# Patient Record
Sex: FEMALE | Race: WHITE | Employment: OTHER | URBAN - METROPOLITAN AREA
[De-identification: names, ages, dates, MRNs, and addresses within clinical notes are randomized per-mention and may not be internally consistent; named-entity substitution may affect disease eponyms.]

---

## 2018-01-05 ENCOUNTER — HOSPITAL ENCOUNTER (OUTPATIENT)
Dept: ULTRASOUND IMAGING | Age: 68
Discharge: HOME | End: 2018-01-05
Attending: FAMILY MEDICINE
Payer: MEDICARE

## 2018-01-05 ENCOUNTER — HOSPITAL ENCOUNTER (OUTPATIENT)
Dept: CT IMAGING | Age: 68
Discharge: HOME | End: 2018-01-05
Attending: FAMILY MEDICINE
Payer: MEDICARE

## 2018-01-05 DIAGNOSIS — R41.3 MEMORY CHANGE: ICD-10-CM

## 2018-01-05 DIAGNOSIS — I65.29 CAROTID ATHEROSCLEROSIS, UNSPECIFIED LATERALITY: ICD-10-CM

## 2018-01-05 PROCEDURE — 93880 EXTRACRANIAL BILAT STUDY: CPT

## 2018-01-05 PROCEDURE — 70450 CT HEAD/BRAIN W/O DYE: CPT

## 2018-01-30 ENCOUNTER — HOSPITAL ENCOUNTER (OUTPATIENT)
Dept: LAB | Age: 68
Discharge: HOME | End: 2018-01-30
Payer: MEDICARE

## 2018-01-30 DIAGNOSIS — E78.00 HYPERCHOLESTEREMIA: Chronic | ICD-10-CM

## 2018-01-30 LAB
CHOLEST SERPL-MCNC: 277 MG/DL
HDLC SERPL-MCNC: 88 MG/DL (ref 40–60)
HDLC SERPL: 3.1 {RATIO}
LDLC SERPL CALC-MCNC: 166.8 MG/DL
LIPID PROFILE,FLP: ABNORMAL
TRIGL SERPL-MCNC: 111 MG/DL (ref 35–150)
VLDLC SERPL CALC-MCNC: 22.2 MG/DL (ref 6–23)

## 2018-01-30 PROCEDURE — 80061 LIPID PANEL: CPT | Performed by: INTERNAL MEDICINE

## 2018-01-30 PROCEDURE — 36415 COLL VENOUS BLD VENIPUNCTURE: CPT | Performed by: INTERNAL MEDICINE

## 2018-01-30 NOTE — PROGRESS NOTES
TOTAL CHOLESTEROL AND LDL MUCH HIGHER OFF LIPITOR. ? DOES SHE WANT TO RESUME THIS?  IF SO, RESUME PRIOR DOSE AND RECHECK LIPID AND LIVER IN 3 MOS

## 2018-06-19 PROBLEM — R29.6 FALLING: Status: ACTIVE | Noted: 2018-06-19

## 2018-06-19 PROBLEM — G47.52 REM SLEEP BEHAVIOR DISORDER: Status: ACTIVE | Noted: 2018-06-19

## 2018-06-19 PROBLEM — G31.83 LEWY BODY DEMENTIA WITHOUT BEHAVIORAL DISTURBANCE (HCC): Status: ACTIVE | Noted: 2018-06-19

## 2018-06-19 PROBLEM — F02.80 LEWY BODY DEMENTIA WITHOUT BEHAVIORAL DISTURBANCE (HCC): Status: ACTIVE | Noted: 2018-06-19

## 2018-06-19 PROBLEM — R44.3 HALLUCINATION: Status: ACTIVE | Noted: 2018-06-19

## 2018-07-02 ENCOUNTER — HOSPITAL ENCOUNTER (OUTPATIENT)
Dept: PHYSICAL THERAPY | Age: 68
Discharge: HOME | End: 2018-07-02
Attending: PSYCHIATRY & NEUROLOGY
Payer: MEDICARE

## 2018-07-02 DIAGNOSIS — R29.6 FALLING: ICD-10-CM

## 2018-07-02 DIAGNOSIS — F02.80 LEWY BODY DEMENTIA WITHOUT BEHAVIORAL DISTURBANCE (HCC): ICD-10-CM

## 2018-07-02 DIAGNOSIS — G31.83 LEWY BODY DEMENTIA WITHOUT BEHAVIORAL DISTURBANCE (HCC): ICD-10-CM

## 2018-07-02 PROCEDURE — G8979 MOBILITY GOAL STATUS: HCPCS

## 2018-07-02 PROCEDURE — G8978 MOBILITY CURRENT STATUS: HCPCS

## 2018-07-02 PROCEDURE — 97112 NEUROMUSCULAR REEDUCATION: CPT

## 2018-07-02 PROCEDURE — G8980 MOBILITY D/C STATUS: HCPCS

## 2018-07-02 PROCEDURE — 97161 PT EVAL LOW COMPLEX 20 MIN: CPT

## 2018-07-02 NOTE — THERAPY EVALUATION
Bettye Banda  : 1950  [place]  Secondary: [address]  [address] [facility], [address] [place] [address] [address], 4999 [facility]  [phone]   [phone]        OUTPATIENT PHYSICAL THERAPY:Initial Assessment and Discharge 2018   ICD-10: Treatment Diagnosis: Difficulty in walking, not elsewhere classified (R26.2)  Precautions/Allergies:   Nucynta [tapentadol]; Percocet [oxycodone-acetaminophen]; and Tramadol   Fall Risk Score: 5 (? 5 = High Risk)  MD Orders: Evaluate and treat  MEDICAL/REFERRING DIAGNOSIS:  Lewy body dementia without behavioral disturbance [G31.83, F02.80]  Falling [R29.6]   DATE OF ONSET: Chronic   REFERRING PHYSICIAN: Rosalind Vázquez MD  RETURN PHYSICIAN APPOINTMENT: unknown      INITIAL ASSESSMENT:  Ms. Dayanara Vogt presents within normal limits with lower extremity strength testing, and above fall risk with scores received on Mccracken Balance Scale and Dynamic Gait Index. Patient reports her symptoms have improved tremendously since starting Aricept. Patient seems a bit impulsive and tends to turn quickly with movements. Patient was advised to slow down with walking, with body turns, and with steps. I recommend patient see our speech therapy for a evaluation. Patient agreeable to this plan. Patient given home exercise program consisting of balance exercises. Problems and goals not stated. Patient discharged from physical therapy. Patient agreeable with this plan. Thank you for this referral.      PROBLEM LIST (Impacting functional limitations):  1. No problems stated INTERVENTIONS PLANNED:  1. No interventions stated   TREATMENT PLAN:  Effective Dates: 2018. Frequency/Duration: One time appointment for initial evaluation and home exercise program.    GOALS: (Goals have been discussed and agreed upon with patient.)  Short-Term Functional Goals: Time Frame:   1.  No short term goals stated   Discharge Goals: Time Frame:   1. No discharge goals stated  Regarding Briseyda Ogden's therapy, I certify that the treatment plan above will be carried out by a therapist or under their direction. Thank you for this referral,  Khadijah Rivas PT     Referring Physician Signature: Roxana Navarrete MD              Date                    The information in this section was collected on 7/2/2018 (except where otherwise noted). HISTORY:   History of Present Injury/Illness (Reason for Referral):  Patient reports a significant history of falling. Patient reports beginning Aricept several weeks ago. Patient reports this has helped her symptoms tremendously. Patient reports increased energy. Patient is walking daily for exercise. Patient rates pain level as 0/10, and dizziness as 0/10. Patient reports still having some difficulty with numbers. Patient reports writing a check for 10,000 dollars when it was suppose to be 10 dollars. 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 Past Medical History/Comorbidities:   Ms. Rick Moody  has a past medical history of CAD (coronary artery disease); Falling (6/19/2018); Hallucination (6/19/2018); Hyperlipidemia (9/29/2015); Lewy body dementia without behavioral disturbance (6/19/2018); Mitral valve regurgitation (7/21/2015); Premature ventricular contractions (9/29/2015); REM sleep behavior disorder (6/19/2018); S/P MVR (mitral valve replacement) (2/95/4878); and Systolic CHF, acute on chronic ([facility] [address] (07/22/2015). Ms. Rick Moody  has a past surgical history that includes hx hysterectomy (2003); pr breast surgery procedure unlisted; hx tonsillectomy (age 17); hx heart catheterization (7/14/2015); and hx breast biopsy. Social History/Living Environment:     Lives with spouse   Prior Level of Function/Work/Activity:  Independent. Currently not working. Dominant Side:         RIGHT  Personal Factors:          Sex:  female        Age:  74 y.o.   Current Medications:       Current Outpatient Prescriptions:     donepezil (ARICEPT) 5 mg tablet, 1 qhs, call in one month for 10 mg taking QAM., Disp: 30 Tab, Rfl: 0    escitalopram oxalate (LEXAPRO) 20 mg tablet, Take 1 Tab by mouth daily. , Disp: 90 Tab, Rfl: 3    estradiol (CLIMARA) 0.0375 mg/24 hr, 1 Patch by TransDERmal route Every Saturday. , Disp: 12 Patch, Rfl: 3    atorvastatin (LIPITOR) 40 mg tablet, Take 1 Tab by mouth daily. , Disp: 90 Tab, Rfl: 3   Date Last Reviewed:  7/2/2018   Number of Personal Factors/Comorbidities that affect the Plan of Care: 0: LOW COMPLEXITY   EXAMINATION:   Observation/Orthostatic Postural Assessment: Forward head/rounded shoulders posture  Functional Mobility:         Gait/Ambulation:  Patient ambulates with normal gait pattern. Strength:          Hip flexion 4+/5, hip abduction 4+/5, hip adduction 5/5, quadriceps 5/5, hamstrings 5/5, ankle dorsiflexion 5/5, and ankle plantarflexion 5/5. Sensation:         Within normal limits. Postural Control & Balance:  · Mccracken Balance Scale:  56/56.   (A score less than 45/56 indicates high risk of falls)     · Dynamic Gait Index:  23/24.   (A score less than or equal to19/24 is abnormal and predictive of falls)     · [employer] Equ[employer]UR Aerospacet Sensory Organization Test:  Not tested. Body Structures Involved:  1. None Body Functions Affected:  1. Neuromusculoskeletal Activities and Participation Affected:  1. None   Number of elements (examined above) that affect the Plan of Care: 1-2: LOW COMPLEXITY   CLINICAL PRESENTATION:   Presentation: Stable and uncomplicated: LOW COMPLEXITY   CLINICAL DECISION MAKING:   Outcome Measure: Tool Used: Mccracken Balance Scale  Score:  Initial: 56/56 Most Recent: X/56 (Date: -- )   Interpretation of Score: Each section is scored on a 0-4 scale, 0 representing the patients inability to perform the task and 4 representing independence.   The scores of each section are added together for a total score of 56.  The higher the patients score, the more independent the patient is. Any score below 45 indicates increased risk for falls. Score 56 55-45 44-34 33-23 22-12 11-1 0   Modifier CH CI CJ CK CL CM CN     ? Mobility - Walking and Moving Around:     - CURRENT STATUS: CH - 0% impaired, limited or restricted    - GOAL STATUS: CH - 0% impaired, limited or restricted    - D/C STATUS:  CH - 0% impaired, limited or restricted    ·    Use of outcome tool(s) and clinical judgement create a POC that gives a: Clear prediction of patient's progress: LOW COMPLEXITY            TREATMENT:   (In addition to Assessment/Re-Assessment sessions the following treatments were rendered)  Pre-treatment Symptoms/Complaints:  None  Pain: Initial:   Pain Intensity 1: 0  Post Session:  0     Initial Evaluation: 30 minutes  NEUROMUSCULAR RE-EDUCATION: (15 minutes):  Exercise/activities per grid below to improve balance and coordination. Required minimal verbal cues to promote dynamic balance in standing. Date:  7/2/2018 Date:   Date:     Activity/Exercise Parameters Parameters Parameters   Marching in place x10B     Walking with horizontal head turns 4 laps     Walking with vertical head turns 4 laps     Feet together Eyes closed     Tandem stance Eyes open     Single leg stance Eyes open           [employer] [facility]  Treatment/Session Assessment:    · Response to Treatment:  Tolerated without issues. · Recommendations/Intent for next treatment session: One time appointment for initial evaluation and home exercise program.  Patient discharged from physical therapy.   Total Treatment Duration:  PT Patient Time In/Time Out  Time In: 1300  Time Out: 2000 Robert Friedman

## 2018-07-02 NOTE — PROGRESS NOTES
Ambulatory/Rehab Services H2 Model Falls Risk Assessment    Risk Factor Pts. ·   Confusion/Disorientation/Impulsivity  []    4 ·   Symptomatic Depression  []   2 ·   Altered Elimination  []   1 ·   Dizziness/Vertigo  []   1 ·   Gender (Male)  []   1 ·   Any administered antiepileptics (anticonvulsants):  []   2 ·   Any administered benzodiazepines:  []   1 ·   Visual Impairment (specify):  []   1 ·   Portable Oxygen Use  []   1 ·   Orthostatic ? BP  []   1 ·   History of Recent Falls (within 3 mos.)  [x]   5     Ability to Rise from Chair (choose one) Pts. ·   Ability to rise in a single movement  [x]   0 ·   Pushes up, successful in one attempt  []   1 ·   Multiple attempts, but successful  []   3 ·   Unable to rise without assistance  []   4   Total: (5 or greater = High Risk) 5     Falls Prevention Plan:   []                Physical Limitations to Exercise (specify):   []                Mobility Assistance Device (type):   [x]                Exercise/Equipment Adaptation (specify): Patient will be supervised in the clinic at all times due to higher fall risk. ©2010 [facility] . [facility] States Patent #[id number].  Federal Law prohibits the replication, distribution or use without written permission from [facility]

## 2018-07-06 ENCOUNTER — HOSPITAL ENCOUNTER (OUTPATIENT)
Dept: LAB | Age: 68
Discharge: HOME | End: 2018-07-06
Attending: PSYCHIATRY & NEUROLOGY
Payer: MEDICARE

## 2018-07-06 DIAGNOSIS — R44.3 HALLUCINATION: ICD-10-CM

## 2018-07-06 LAB — TSH SERPL DL<=0.005 MIU/L-ACNC: 1.92 UIU/ML (ref 0.36–3.74)

## 2018-07-06 PROCEDURE — 84443 ASSAY THYROID STIM HORMONE: CPT | Performed by: PSYCHIATRY & NEUROLOGY

## 2018-07-06 PROCEDURE — 36415 COLL VENOUS BLD VENIPUNCTURE: CPT | Performed by: PSYCHIATRY & NEUROLOGY

## 2018-07-09 ENCOUNTER — HOSPITAL ENCOUNTER (OUTPATIENT)
Dept: MRI IMAGING | Age: 68
Discharge: HOME | End: 2018-07-09
Attending: PSYCHIATRY & NEUROLOGY
Payer: MEDICARE

## 2018-07-09 DIAGNOSIS — R44.3 HALLUCINATION: ICD-10-CM

## 2018-07-09 PROCEDURE — 70551 MRI BRAIN STEM W/O DYE: CPT

## 2018-07-09 NOTE — PROGRESS NOTES
Please notify patient that her brain MRI looked normal, no obvious brain volume loss, no stroke, no brain tumor.

## 2018-07-18 ENCOUNTER — HOSPITAL ENCOUNTER (OUTPATIENT)
Dept: PHYSICAL THERAPY | Age: 68
Discharge: HOME | End: 2018-07-18
Attending: PSYCHIATRY & NEUROLOGY
Payer: MEDICARE

## 2018-07-18 DIAGNOSIS — F02.80 LEWY BODY DEMENTIA WITHOUT BEHAVIORAL DISTURBANCE (HCC): ICD-10-CM

## 2018-07-18 DIAGNOSIS — G31.83 LEWY BODY DEMENTIA WITHOUT BEHAVIORAL DISTURBANCE (HCC): ICD-10-CM

## 2018-07-18 PROCEDURE — 92523 SPEECH SOUND LANG COMPREHEN: CPT | Performed by: SPEECH-LANGUAGE PATHOLOGIST

## 2018-07-18 PROCEDURE — G9168 MEMORY CURRENT STATUS: HCPCS | Performed by: SPEECH-LANGUAGE PATHOLOGIST

## 2018-07-18 PROCEDURE — G9169 MEMORY GOAL STATUS: HCPCS | Performed by: SPEECH-LANGUAGE PATHOLOGIST

## 2018-07-19 NOTE — THERAPY EVALUATION
Radhika Hough  : 1950  [place]  Secondary: [address] at [facility]  27[address] [facility], [address] [place] [address] [address], 2045 [facility]  [phone]   [phone]         OUTPATIENT SPEECH LANGUAGE PATHOLOGY: Initial Assessment    ICD-10: Treatment Diagnosis: Frontal lobe and executive function deficit R41.844   REFERRING PHYSICIAN: Ana M Evans MD MD Orders: evaluate and treat  Return Physician Appointment: 2018  PAST MEDICAL HISTORY:   Ms. Ally Jiménez is a 74 y.o. female who  has a past medical history of CAD (coronary artery disease); Falling (2018); Hallucination (2018); Hyperlipidemia (2015); Lewy body dementia without behavioral disturbance (2018); Mitral valve regurgitation (2015); Premature ventricular contractions (2015); REM sleep behavior disorder (2018); S/P MVR (mitral valve replacement) (); and Systolic CHF, acute on chronic ([facility] [address] (2015). She also  has a past surgical history that includes hx hysterectomy (); pr breast surgery procedure unlisted; hx tonsillectomy (age 17); hx heart catheterization (2015); and hx breast biopsy. MEDICAL/REFERRING DIAGNOSIS: Lewy body dementia without behavioral disturbance [G31.83, F02.80]  DATE OF ONSET: 2018   PRIOR LEVEL OF FUNCTION: Independent with ADL's  PRECAUTIONS/ALLERGIES: NKDA  ASSESSMENT:  Patient is a 74year old female who was referred for speech evaluation due to recent diagnosis of Lewy body dementia. Patient stated that as she looks back over the last few months, there were definite signs that she was having trouble cognitively. She stated that she had difficulty with money management, memory, visual perception, sequencing and learning new skills.  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 Bang Jimenez who started patient on Aricept. Patient states much improvement with vision and less hallucinations. Currently patient's spouse manages finances however she continues to drive. Based on the objective data described below, the patient presents with mild moderate cognitive deficits. She was assessed with the [employer]. She scored Mod I to Independent in the areas of: recent memory and remote memory. She scored profound in the area of immediate memory at 37%tile and moderately impaired in the area of temporal orientation (recent memory) at 50%tile. All other subtest's had to be deferred as patient would get off task requiring some redirection in addition to patient arriving late to her appointment. Therefore other sub tests will be given at next scheduled visit. Patient would benefit from skilled intervention to provide patient with compensatory strategies to help maximize her functional independence with ADL's. ST recommends therapy at 1x a week. Patient will benefit from skilled intervention to address the above impairments. ?????? ? ? This section established at most recent assessment??????????  PROBLEM LIST (Impairments causing functional limitations):  1. Cognitive deficits  2. Executive functioning deficits  GOALS: (Goals have been discussed and agreed upon with patient.)  SHORT-TERM FUNCTIONAL GOALS: Time Frame: 3-6 months  1. Patient will complete immediate and delayed recall tasks with the use of strategies at Mod A with 50% accuracy. 2. Patient will complete following multi step directions at [address] with 80% accuracy. 3. Patient will complete organization tasks at Mod A with 60% accuracy. 4. Patient will complete time and money tasks at Mod A with 60% accuracy. 5. Patient will complete attention tasks at Mod A with 70% accuracy. DISCHARGE GOALS: Time Frame: 12 weeks  1.  Patient will increase cognitive and linguistic skills for ADL's across all setting at [address] [place] [address] with 80% accuracy. REHABILITATION POTENTIAL FOR STATED GOALS: Donavan Galliano OF CARE:  INTERVENTIONS PLANNED: (Benefits and precautions of therapy have been discussed with the patient.)  1. Speech therapy  TREATMENT PLAN EFFECTIVE DATES: 7/18/2018 TO 10/18/2018 (90 days). FREQUENCY/DURATION: Continue to follow patient 1 time a week for 90 days to address above goals. Regarding An Ogden's therapy, I certify that the treatment plan above will be carried out by a therapist or under their direction. Thank you for this referral,  ENDY Bloom Ed CCC-SLP                   Referring Physician Signature: Roger Gibbs MD     Date      SUBJECTIVE:  Alert but anxious  Present Symptoms: Memory loss      Current Medications:   Current Outpatient Prescriptions on File Prior to Encounter   Medication Sig Dispense Refill    donepezil (ARICEPT) 5 mg tablet 1 tab by mouth in am 90 Tab 1    escitalopram oxalate (LEXAPRO) 20 mg tablet Take 1 Tab by mouth daily. 90 Tab 3    estradiol (CLIMARA) 0.0375 mg/24 hr 1 Patch by TransDERmal route Every Saturday. 12 Patch 3    atorvastatin (LIPITOR) 40 mg tablet Take 1 Tab by mouth daily. 90 Tab 3     No current facility-administered medications on file prior to encounter. Date Last Reviewed: 7/18/18  Social History/Home Situation:        Work/Activity History: retired    OBJECTIVE:  Objective Measure: Tool Used: National Outcomes Measurement System: Functional Communication Measures: MEMORY  Score:  Initial: 4 Most Recent: X (Date: -- )   Interpretation of Tool: This measure describes the change in functional communication status subsequent to speech-language pathology treatment of patients who have memory deficits. o Level 1:  The individual is unable to recall any information, regardless of cueing.   o Level 2: The individual consistently requires maximal verbal cues or uses external aids to recall personal information (e.g., family members, biographical information, physical location, etc.) in structured environments. o Level 3: The individual usually requires maximum cues to recall or use external aids for simple routine and personal information (e.g., schedule, names of familiar staff, location of therapy areas, etc.) in structured environments. o Level 4: The individual occasionally requires minimal cues to recall or use external memory aids for simple routine and personal information in structured environments. The individual requires consistent maximal cues to recall or use memory aids for complex and novel information (e.g., carry out multiple steps activities, accommodate schedule changes, anticipate meal times, etc.), plan and follow through on simple future events (e.g., use calendar to keep appointments, use log books to complete a single assignment/task, etc.) in structured environments. o Level 5 The individual consistently requires minimal cues to recall or use external memory aids for complex and novel information. The individual consistently requires minimal cues to plan and follow through on complex future events (e.g., menu planning and meal preparation, planning a party, etc.).  o Level 6: The individual is able to recall or use external aids/memory strategies for complex information and planning complex future events most of the time. When there is a breakdown in the use of recall/memory strategies/external memory aids, the individual occasionally requires minimal cues. These breakdowns may occasionally interfere with the individuals functioning in vocational, avocational, and social activities. o Level 7: The individual is successful and independent in recalling or using external aids/memory strategies for complex information and planning future events in all vocational, avocational, and social activities. Score Level 7 Level 6 Level 5 Level 4 Level 3 Level 2 Level 1   Modifier CH CI CJ CK CL CM CN   ?  Memory:  - CURRENT STATUS: CK - 40%-59% impaired, limited or restricted    - GOAL STATUS:  CI - 1%-19% impaired, limited or restricted    - D/C STATUS:  ---------------To be determined---------------           Oral Motor Structure/Speech:  Oral-Motor Structure/Motor Speech  Face: No impairment  Labial: No impairment  Dentition: Natural  Lingual: No impairment  Velum: No impairment  Mandible: No impairment  Apraxic Characteristics: None  Dysarthric Characteristics: None  Intelligibility: No impairment  Intonation: WNl  Rate: WNL  Prosody: WNL  Overall Impairment Severity: None    SPEECH-LANGUAGE COGNITIVE EVALUATION  Tests Given:Ross Information Processing Assessment    Mental Status:  Neurologic State: Alert  Orientation Level: Disoriented to time;Oriented to place;Oriented to person  Cognition: Follows commands;Memory loss  Perception: Appears intact  Perseveration: No perseveration noted  Safety/Judgement: Awareness of environment    Motor Speech:  Oral-Motor Structure/Motor Speech  Face: No impairment  Labial: No impairment  Dentition: Natural  Lingual: No impairment  Velum: No impairment  Mandible: No impairment  Apraxic Characteristics: None  Dysarthric Characteristics: None  Intelligibility: No impairment  Intonation: WNl  Rate: WNL  Prosody: WNL  Overall Impairment Severity: None    Auditory Comprehension:   Auditory Comprehension  Auditory Impairment: No     Reading Comprehension:   Reading Comprehension  Visual Impairment: No impairment  Scanning/Tracking : No impairment  Sentence: No Impairment  Paragraph : No impairment  Oral Reading: No impairment  Overall Impairment Severity: None    Verbal Expression:   Verbal Expression  Primary Mode of Expression: Verbal  Initiation: No impairment  Automatic Speech Task: No impairment  Repetition: No impairment  Naming: No impairment  Sentence Completion: No impairment  Sentence Formulation: No impairment  Conversation: No impairment  Overall Impairment: None    Written Expression:   Written Expression  Pre-Morbid Dominant Hand: Unknown/unable to assess    Neuro-Linguistics:   Memory: Impaired  Mathematical: Impaired  Organization: Impaired  Assessment/Reassessment only, no treatment provided today  __________________________________________________________________________________________________  History of Present Injury/Illness (Reason for Referral): Lewy body dementia  Treatment Assessment:  Evaluation completed. Progression/Medical Necessity:   · Patient is expected to demonstrate progress in cognitive ability to decrease assistance required communication and increase independence with activities of daily living. Compliance with Program/Exercises: Will assess as treatment progresses. Reason for Continuation of Services/Other Comments:  · Patient continues to require skilled intervention due to medical complications. Recommendations/Intent for next treatment session: \"Treatment next visit will focus on goals\". Total Treatment Duration:  Time In: 1430  Time Out: [address], Corinne Fails. Miriam Settler

## 2018-07-30 PROBLEM — R94.01 ABNORMAL EEG: Status: ACTIVE | Noted: 2018-07-30

## 2018-08-08 ENCOUNTER — HOSPITAL ENCOUNTER (OUTPATIENT)
Dept: PHYSICAL THERAPY | Age: 68
Discharge: HOME | End: 2018-08-08
Attending: PSYCHIATRY & NEUROLOGY
Payer: MEDICARE

## 2018-08-15 ENCOUNTER — HOSPITAL ENCOUNTER (OUTPATIENT)
Dept: PHYSICAL THERAPY | Age: 68
Discharge: HOME | End: 2018-08-15
Attending: PSYCHIATRY & NEUROLOGY
Payer: MEDICARE

## 2018-08-15 PROCEDURE — 92507 TX SP LANG VOICE COMM INDIV: CPT | Performed by: SPEECH-LANGUAGE PATHOLOGIST

## 2018-08-15 NOTE — THERAPY EVALUATION
Romayne Lust  : 1950  [place]  Secondary: [facility] [address] [facility], [address] [address], [address] [facility]  [phone]   [phone]         OUTPATIENT SPEECH LANGUAGE PATHOLOGY: Daily Note 1    ICD-10: Treatment Diagnosis: Frontal lobe and executive function deficit R41.844   REFERRING PHYSICIAN: Jody Whitman MD MD Orders: evaluate and treat  Return Physician Appointment: 2018  PAST MEDICAL HISTORY:   Ms. Yara Cuello is a 79 y.o. female who  has a past medical history of Abnormal EEG (2018); CAD (coronary artery disease); Falling (2018); Hallucination (2018); Hyperlipidemia (2015); Lewy body dementia without behavioral disturbance (2018); Mitral valve regurgitation (2015); Premature ventricular contractions (2015); REM sleep behavior disorder (2018); S/P MVR (mitral valve replacement) (); and Systolic CHF, acute on chronic ([facility] [address] (2015). She also  has a past surgical history that includes hx hysterectomy (); pr breast surgery procedure unlisted; hx tonsillectomy (age 17); hx heart catheterization (2015); and hx breast biopsy. MEDICAL/REFERRING DIAGNOSIS: Dementia in other diseases classified elsewhere without behavioral disturbance [F02.80]  Dementia with Lewy bodies [G31.83]  DATE OF ONSET: 2018   PRIOR LEVEL OF FUNCTION: Independent with ADL's  PRECAUTIONS/ALLERGIES: NKDA  ASSESSMENT:  Patient reports overall feeling well. States that she has a good and bad days. Reports having another EEG soon to check for seizures. Otherwise she has no complaints. Patient will benefit from skilled intervention to address the above impairments. ?????? ? ? This section established at most recent assessment??????????  PROBLEM LIST (Impairments causing functional limitations):  1. Cognitive deficits  2.  Executive functioning deficits  GOALS: (Goals have been discussed and agreed upon with patient.)  SHORT-TERM FUNCTIONAL GOALS: Time Frame: 3-6 months  1. Patient will complete immediate and delayed recall tasks with the use of strategies at Mod A with 50% accuracy. 2. Patient will complete following multi step directions at [address] with 80% accuracy. 3. Patient will complete organization tasks at Mod A with 60% accuracy. 4. Patient will complete time and money tasks at Mod A with 60% accuracy. 5. Patient will complete attention tasks at Mod A with 70% accuracy. DISCHARGE GOALS: Time Frame: 12 weeks  1. Patient will increase cognitive and linguistic skills for ADL's across all setting at [address] with 80% accuracy. REHABILITATION POTENTIAL FOR STATED GOALS: [place] OF CARE:  INTERVENTIONS PLANNED: (Benefits and precautions of therapy have been discussed with the patient.)  1. Speech therapy  TREATMENT PLAN EFFECTIVE DATES: 7/18/2018 TO 10/18/2018 (90 days). FREQUENCY/DURATION: Continue to follow patient 1 time a week for 90 days to address above goals. Regarding Briseyda Ogden's therapy, I certify that the treatment plan above will be carried out by a therapist or under their direction. Thank you for this referral,  ENDY Santiago Ed CCC-SLP                   Referring Physician Signature: Lisa Smith MD     Date      SUBJECTIVE:  Alert but anxious  Present Symptoms: Memory loss   Pain Intensity 1: 0  Current Medications:   Current Outpatient Prescriptions on File Prior to Encounter   Medication Sig Dispense Refill    cholecalciferol (VITAMIN D3) 1,000 unit tablet Take  by mouth daily.  donepezil (ARICEPT) 5 mg tablet 1 tab by mouth in am 90 Tab 1    escitalopram oxalate (LEXAPRO) 20 mg tablet Take 1 Tab by mouth daily. 90 Tab 3    estradiol (CLIMARA) 0.0375 mg/24 hr 1 Patch by TransDERmal route Every Saturday. 12 Patch 3    atorvastatin (LIPITOR) 40 mg tablet Take 1 Tab by mouth daily.  90 Tab 3     No current facility-administered medications on file prior to encounter. Date Last Reviewed: 8/15/18  Social History/Home Situation:        Work/Activity History: retired    OBJECTIVE:  Objective Measure: Tool Used: National Outcomes Measurement System: Functional Communication Measures: MEMORY  Score:  Initial: 4 Most Recent: X (Date: -- )   Interpretation of Tool: This measure describes the change in functional communication status subsequent to speech-language pathology treatment of patients who have memory deficits. o Level 1:  The individual is unable to recall any information, regardless of cueing. o Level 2: The individual consistently requires maximal verbal cues or uses external aids to recall personal information (e.g., family members, biographical information, physical location, etc.) in structured environments. o Level 3: The individual usually requires maximum cues to recall or use external aids for simple routine and personal information (e.g., schedule, names of familiar staff, location of therapy areas, etc.) in structured environments. o Level 4: The individual occasionally requires minimal cues to recall or use external memory aids for simple routine and personal information in structured environments. The individual requires consistent maximal cues to recall or use memory aids for complex and novel information (e.g., carry out multiple steps activities, accommodate schedule changes, anticipate meal times, etc.), plan and follow through on simple future events (e.g., use calendar to keep appointments, use log books to complete a single assignment/task, etc.) in structured environments. o Level 5 The individual consistently requires minimal cues to recall or use external memory aids for complex and novel information.  The individual consistently requires minimal cues to plan and follow through on complex future events (e.g., menu planning and meal preparation, planning a party, etc.).  o Level 6: The individual is able to recall or use external aids/memory strategies for complex information and planning complex future events most of the time. When there is a breakdown in the use of recall/memory strategies/external memory aids, the individual occasionally requires minimal cues. These breakdowns may occasionally interfere with the individuals functioning in vocational, avocational, and social activities. o Level 7: The individual is successful and independent in recalling or using external aids/memory strategies for complex information and planning future events in all vocational, avocational, and social activities. Score Level 7 Level 6 Level 5 Level 4 Level 3 Level 2 Level 1   Modifier CH CI CJ CK CL CM CN   ? Memory:     - CURRENT STATUS: CK - 40%-59% impaired, limited or restricted    - GOAL STATUS:  CI - 1%-19% impaired, limited or restricted    - D/C STATUS:  ---------------To be determined---------------           Oral Motor Structure/Speech:       SPEECH-LANGUAGE COGNITIVE EVALUATION  Tests Given:Ross Information Processing Assessment    Mental Status:                      Motor Speech: Auditory Comprehension:        Reading Comprehension:        Verbal Expression:        Written Expression:        Neuro-Linguistics:    Patient completed following directions 2 step with 4 components  1st trial: Mod A with 55% accuracy   2nd trial: Mod I 90% accuracy     Completed time task (drawing appropriate hands on clocks) Min A with 83% accuracy    Daily orientation questions at:   Day of the week: +  Month +  Date +  Year +           Cognitive Skills Activities: Activities/Procedures listed utilized to improve and/or restore cognitive function as related to attention to tasks, memory, sequencing, thought organization and compensatory strategies for recall.  Required minimal visual, verbal, manual and   cueing to improve improve recall of information and improve ability to perform graded processes in steps.      __________________________________________________________________________________________________  History of Present Injury/Illness (Reason for Referral): Lewy body dementia  Treatment Assessment:  Evaluation completed. Progression/Medical Necessity:   · Patient is expected to demonstrate progress in cognitive ability to decrease assistance required communication and increase independence with activities of daily living. Compliance with Program/Exercises: Will assess as treatment progresses. Reason for Continuation of Services/Other Comments:  · Patient continues to require skilled intervention due to medical complications. Recommendations/Intent for next treatment session: \"Treatment next visit will focus on goals\". Total Treatment Duration:  Time In: 4240  Time Out: [facility], Aime Lee. Nancy Hebert

## 2018-08-22 ENCOUNTER — HOSPITAL ENCOUNTER (OUTPATIENT)
Dept: PHYSICAL THERAPY | Age: 68
Discharge: HOME | End: 2018-08-22
Attending: PSYCHIATRY & NEUROLOGY
Payer: MEDICARE

## 2018-08-22 PROCEDURE — 92507 TX SP LANG VOICE COMM INDIV: CPT | Performed by: SPEECH-LANGUAGE PATHOLOGIST

## 2018-08-22 NOTE — PROGRESS NOTES
Gino Gentile  : 1950  [place]  Secondary: [facility] [address] [facility], [address] [address], [address] [facility]  [phone]   [phone]         OUTPATIENT SPEECH LANGUAGE PATHOLOGY: Daily Note 2    ICD-10: Treatment Diagnosis: Frontal lobe and executive function deficit R41.844   REFERRING PHYSICIAN: Erasmo Hollis MD MD Orders: evaluate and treat  Return Physician Appointment: 2018  PAST MEDICAL HISTORY:   Ms. Leonel Catherine is a 79 y.o. female who  has a past medical history of Abnormal EEG (2018); CAD (coronary artery disease); Falling (2018); Hallucination (2018); Hyperlipidemia (2015); Lewy body dementia without behavioral disturbance (2018); Mitral valve regurgitation (2015); Premature ventricular contractions (2015); REM sleep behavior disorder (2018); S/P MVR (mitral valve replacement) (); and Systolic CHF, acute on chronic ([facility] [address] (2015). She also  has a past surgical history that includes hx hysterectomy (); pr breast surgery procedure unlisted; hx tonsillectomy (age 17); hx heart catheterization (2015); and hx breast biopsy. MEDICAL/REFERRING DIAGNOSIS: Dementia in other diseases classified elsewhere without behavioral disturbance [F02.80]  Dementia with Lewy bodies [G31.83]  DATE OF ONSET: 2018   PRIOR LEVEL OF FUNCTION: Independent with ADL's  PRECAUTIONS/ALLERGIES: NKDA  ASSESSMENT:  Patient reports overall feeling well. States that she has a good and bad days. Otherwise she has no complaints. Patient will benefit from skilled intervention to address the above impairments. ?????? ? ? This section established at most recent assessment??????????  PROBLEM LIST (Impairments causing functional limitations):  1. Cognitive deficits  2.  Executive functioning deficits  GOALS: (Goals have been discussed and agreed upon with patient.)  SHORT-TERM FUNCTIONAL GOALS: Time Frame: 3-6 months  1. Patient will complete immediate and delayed recall tasks with the use of strategies at Mod A with 50% accuracy. 2. Patient will complete following multi step directions at [address] with 80% accuracy. 3. Patient will complete organization tasks at Mod A with 60% accuracy. 4. Patient will complete time and money tasks at Mod A with 60% accuracy. 5. Patient will complete attention tasks at Mod A with 70% accuracy. DISCHARGE GOALS: Time Frame: 12 weeks  1. Patient will increase cognitive and linguistic skills for ADL's across all setting at [address] with 80% accuracy. REHABILITATION POTENTIAL FOR STATED GOALS: [place] OF CARE:  INTERVENTIONS PLANNED: (Benefits and precautions of therapy have been discussed with the patient.)  1. Speech therapy  TREATMENT PLAN EFFECTIVE DATES: 7/18/2018 TO 10/18/2018 (90 days). FREQUENCY/DURATION: Continue to follow patient 1 time a week for 90 days to address above goals. Regarding Briseyda Ogden's therapy, I certify that the treatment plan above will be carried out by a therapist or under their direction. Thank you for this referral,  ENDY Mccauley Ed CCC-SLP                   Referring Physician Signature: Kenny Reeder MD     Date      SUBJECTIVE:  Alert but anxious  Present Symptoms: Memory loss   Pain Intensity 1: 0  Current Medications:   Current Outpatient Prescriptions on File Prior to Encounter   Medication Sig Dispense Refill    cholecalciferol (VITAMIN D3) 1,000 unit tablet Take  by mouth daily.  donepezil (ARICEPT) 5 mg tablet 1 tab by mouth in am 90 Tab 1    escitalopram oxalate (LEXAPRO) 20 mg tablet Take 1 Tab by mouth daily. 90 Tab 3    estradiol (CLIMARA) 0.0375 mg/24 hr 1 Patch by TransDERmal route Every Saturday. 12 Patch 3    atorvastatin (LIPITOR) 40 mg tablet Take 1 Tab by mouth daily. 90 Tab 3     No current facility-administered medications on file prior to encounter. Date Last Reviewed: 8/22/18  Social History/Home Situation:        Work/Activity History: retired    OBJECTIVE:  Objective Measure: Tool Used: National Outcomes Measurement System: Functional Communication Measures: MEMORY  Score:  Initial: 4 Most Recent: X (Date: -- )   Interpretation of Tool: This measure describes the change in functional communication status subsequent to speech-language pathology treatment of patients who have memory deficits. o Level 1:  The individual is unable to recall any information, regardless of cueing. o Level 2: The individual consistently requires maximal verbal cues or uses external aids to recall personal information (e.g., family members, biographical information, physical location, etc.) in structured environments. o Level 3: The individual usually requires maximum cues to recall or use external aids for simple routine and personal information (e.g., schedule, names of familiar staff, location of therapy areas, etc.) in structured environments. o Level 4: The individual occasionally requires minimal cues to recall or use external memory aids for simple routine and personal information in structured environments. The individual requires consistent maximal cues to recall or use memory aids for complex and novel information (e.g., carry out multiple steps activities, accommodate schedule changes, anticipate meal times, etc.), plan and follow through on simple future events (e.g., use calendar to keep appointments, use log books to complete a single assignment/task, etc.) in structured environments. o Level 5 The individual consistently requires minimal cues to recall or use external memory aids for complex and novel information.  The individual consistently requires minimal cues to plan and follow through on complex future events (e.g., menu planning and meal preparation, planning a party, etc.).  o Level 6: The individual is able to recall or use external aids/memory strategies for complex information and planning complex future events most of the time. When there is a breakdown in the use of recall/memory strategies/external memory aids, the individual occasionally requires minimal cues. These breakdowns may occasionally interfere with the individuals functioning in vocational, avocational, and social activities. o Level 7: The individual is successful and independent in recalling or using external aids/memory strategies for complex information and planning future events in all vocational, avocational, and social activities. Score Level 7 Level 6 Level 5 Level 4 Level 3 Level 2 Level 1   Modifier CH CI CJ CK CL CM CN   ? Memory:     - CURRENT STATUS: CK - 40%-59% impaired, limited or restricted    - GOAL STATUS:  CI - 1%-19% impaired, limited or restricted    - D/C STATUS:  ---------------To be determined---------------           Oral Motor Structure/Speech:       SPEECH-LANGUAGE COGNITIVE EVALUATION  Tests Given:Ross Information Processing Assessment    Mental Status:                      Motor Speech: Auditory Comprehension:        Reading Comprehension:        Verbal Expression:        Written Expression:        Neuro-Linguistics: Reviewed homework with minimal corrections     Problem solving and reasoning task at [address] [place] [address] with 80% accuracy             Cognitive Skills Activities: Activities/Procedures listed utilized to improve and/or restore cognitive function as related to attention to tasks, memory, sequencing, thought organization and compensatory strategies for recall.  Required minimal visual, verbal, manual and   cueing to improve improve recall of information and improve ability to perform graded processes in steps.      __________________________________________________________________________________________________  History of Present Injury/Illness (Reason for Referral): Lewy body dementia  Treatment Assessment:  Evaluation completed. Progression/Medical Necessity:   · Patient is expected to demonstrate progress in cognitive ability to decrease assistance required communication and increase independence with activities of daily living. Compliance with Program/Exercises: Will assess as treatment progresses. Reason for Continuation of Services/Other Comments:  · Patient continues to require skilled intervention due to medical complications. Recommendations/Intent for next treatment session: \"Treatment next visit will focus on goals\". Total Treatment Duration:  Time In: 1030  Time Out: [address] [facility]. Angeline Godfrey

## 2018-08-29 ENCOUNTER — HOSPITAL ENCOUNTER (OUTPATIENT)
Dept: PHYSICAL THERAPY | Age: 68
Discharge: HOME | End: 2018-08-29
Attending: PSYCHIATRY & NEUROLOGY
Payer: MEDICARE

## 2018-08-29 PROCEDURE — 92507 TX SP LANG VOICE COMM INDIV: CPT | Performed by: SPEECH-LANGUAGE PATHOLOGIST

## 2018-08-29 NOTE — PROGRESS NOTES
Trinidad Mejia  : 1950  [place]  Secondary: [place] [id number] [facility], [address] [place] [address],[address], [place] [address].  [phone]   [phone]         OUTPATIENT SPEECH LANGUAGE PATHOLOGY: Daily Note 3    ICD-10: Treatment Diagnosis: Frontal lobe and executive function deficit R41.844   REFERRING PHYSICIAN: Brian Dc MD MD Orders: evaluate and treat  Return Physician Appointment: 2018  PAST MEDICAL HISTORY:   Ms. Alecia Tamez is a 79 y.o. female who  has a past medical history of Abnormal EEG (2018); CAD (coronary artery disease); Falling (2018); Hallucination (2018); Hyperlipidemia (2015); Lewy body dementia without behavioral disturbance (2018); Mitral valve regurgitation (2015); Premature ventricular contractions (2015); REM sleep behavior disorder (2018); S/P MVR (mitral valve replacement) (1181); and Systolic CHF, acute on chronic ([facility] [address] (2015). She also  has a past surgical history that includes hx hysterectomy (); pr breast surgery procedure unlisted; hx tonsillectomy (age 17); hx heart catheterization (2015); and hx breast biopsy. MEDICAL/REFERRING DIAGNOSIS: Dementia in other diseases classified elsewhere without behavioral disturbance [F02.80]  Dementia with Lewy bodies [G31.83]  DATE OF ONSET: 2018   PRIOR LEVEL OF FUNCTION: Independent with ADL's  PRECAUTIONS/ALLERGIES: NKDA  ASSESSMENT:  Patient reports overall feeling well. States that she has a good and bad days. Otherwise she has no complaints. Patient will benefit from skilled intervention to address the above impairments. ?????? ? ? This section established at most recent assessment??????????  PROBLEM LIST (Impairments causing functional limitations):  1. Cognitive deficits  2.  Executive functioning deficits  GOALS: (Goals have been discussed and agreed upon with patient.)  SHORT-TERM FUNCTIONAL GOALS: Time Frame: 3-6 months  1. Patient will complete immediate and delayed recall tasks with the use of strategies at Mod A with 50% accuracy. 2. Patient will complete following multi step directions at [address] with 80% accuracy. 3. Patient will complete organization tasks at Mod A with 60% accuracy. 4. Patient will complete time and money tasks at Mod A with 60% accuracy. 5. Patient will complete attention tasks at Mod A with 70% accuracy. DISCHARGE GOALS: Time Frame: 12 weeks  1. Patient will increase cognitive and linguistic skills for ADL's across all setting at [address] with 80% accuracy. REHABILITATION POTENTIAL FOR STATED GOALS: Ana Hash OF CARE:  INTERVENTIONS PLANNED: (Benefits and precautions of therapy have been discussed with the patient.)  1. Speech therapy  TREATMENT PLAN EFFECTIVE DATES: 7/18/2018 TO 10/18/2018 (90 days). FREQUENCY/DURATION: Continue to follow patient 1 time a week for 90 days to address above goals. Regarding Briseyda Ogden's therapy, I certify that the treatment plan above will be carried out by a therapist or under their direction. Thank you for this referral,  ENDY Andrade Ed CCC-SLP                   Referring Physician Signature: Mando Arce MD     Date      SUBJECTIVE:  Alert but anxious  Present Symptoms: Memory loss   Pain Intensity 1: 0  Current Medications:   Current Outpatient Prescriptions on File Prior to Encounter   Medication Sig Dispense Refill    cholecalciferol (VITAMIN D3) 1,000 unit tablet Take  by mouth daily.  donepezil (ARICEPT) 5 mg tablet 1 tab by mouth in am 90 Tab 1    escitalopram oxalate (LEXAPRO) 20 mg tablet Take 1 Tab by mouth daily. 90 Tab 3    estradiol (CLIMARA) 0.0375 mg/24 hr 1 Patch by TransDERmal route Every Saturday. 12 Patch 3    atorvastatin (LIPITOR) 40 mg tablet Take 1 Tab by mouth daily. 90 Tab 3     No current facility-administered medications on file prior to encounter. Date Last Reviewed: 8/29/18  Social History/Home Situation:        Work/Activity History: retired    OBJECTIVE:  Objective Measure: Tool Used: National Outcomes Measurement System: Functional Communication Measures: MEMORY  Score:  Initial: 4 Most Recent: X (Date: -- )   Interpretation of Tool: This measure describes the change in functional communication status subsequent to speech-language pathology treatment of patients who have memory deficits. o Level 1:  The individual is unable to recall any information, regardless of cueing. o Level 2: The individual consistently requires maximal verbal cues or uses external aids to recall personal information (e.g., family members, biographical information, physical location, etc.) in structured environments. o Level 3: The individual usually requires maximum cues to recall or use external aids for simple routine and personal information (e.g., schedule, names of familiar staff, location of therapy areas, etc.) in structured environments. o Level 4: The individual occasionally requires minimal cues to recall or use external memory aids for simple routine and personal information in structured environments. The individual requires consistent maximal cues to recall or use memory aids for complex and novel information (e.g., carry out multiple steps activities, accommodate schedule changes, anticipate meal times, etc.), plan and follow through on simple future events (e.g., use calendar to keep appointments, use log books to complete a single assignment/task, etc.) in structured environments. o Level 5 The individual consistently requires minimal cues to recall or use external memory aids for complex and novel information.  The individual consistently requires minimal cues to plan and follow through on complex future events (e.g., menu planning and meal preparation, planning a party, etc.).  o Level 6: The individual is able to recall or use external aids/memory strategies for complex information and planning complex future events most of the time. When there is a breakdown in the use of recall/memory strategies/external memory aids, the individual occasionally requires minimal cues. These breakdowns may occasionally interfere with the individuals functioning in vocational, avocational, and social activities. o Level 7: The individual is successful and independent in recalling or using external aids/memory strategies for complex information and planning future events in all vocational, avocational, and social activities. Score Level 7 Level 6 Level 5 Level 4 Level 3 Level 2 Level 1   Modifier CH CI CJ CK CL CM CN   ? Memory:     - CURRENT STATUS: CK - 40%-59% impaired, limited or restricted    - GOAL STATUS:  CI - 1%-19% impaired, limited or restricted    - D/C STATUS:  ---------------To be determined---------------           Oral Motor Structure/Speech:       SPEECH-LANGUAGE COGNITIVE EVALUATION  Tests Given:Ross Information Processing Assessment    Mental Status:                      Motor Speech: Auditory Comprehension:        Reading Comprehension:        Verbal Expression:        Written Expression:        Neuro-Linguistics: Reviewed homework with minimal corrections     Patient completed time management tasks at: Max A with 40% accuracy secondary to decreased mathematical calculations. Increased time for task completion. Cognitive Skills Activities: Activities/Procedures listed utilized to improve and/or restore cognitive function as related to attention to tasks, memory, sequencing, thought organization and compensatory strategies for recall.  Required minimal visual, verbal, manual and   cueing to improve improve recall of information and improve ability to perform graded processes in steps.      __________________________________________________________________________________________________  History of Present Injury/Illness (Reason for Referral): Lewy body dementia  Treatment Assessment:  Evaluation completed. Progression/Medical Necessity:   · Patient is expected to demonstrate progress in cognitive ability to decrease assistance required communication and increase independence with activities of daily living. Compliance with Program/Exercises: Will assess as treatment progresses. Reason for Continuation of Services/Other Comments:  · Patient continues to require skilled intervention due to medical complications. Recommendations/Intent for next treatment session: \"Treatment next visit will focus on goals\". Total Treatment Duration:  Time In: 1030  Time Out: [address] [place], [place]. Donavan Parker

## 2018-09-19 ENCOUNTER — HOSPITAL ENCOUNTER (OUTPATIENT)
Dept: PHYSICAL THERAPY | Age: 68
Discharge: HOME | End: 2018-09-19
Attending: PSYCHIATRY & NEUROLOGY
Payer: MEDICARE

## 2018-09-19 PROCEDURE — 92507 TX SP LANG VOICE COMM INDIV: CPT | Performed by: SPEECH-LANGUAGE PATHOLOGIST

## 2018-09-19 PROCEDURE — 97127 HC ST THER IVNTJ W/FOCUS COG FUNCJ: CPT | Performed by: SPEECH-LANGUAGE PATHOLOGIST

## 2018-09-19 NOTE — PROGRESS NOTES
Enzo Ar  : 1950  [place]  Secondary: [facility] [address] [facility], [address] [place] [address],[address], [place] [address].  [phone]   [phone]         OUTPATIENT SPEECH LANGUAGE PATHOLOGY: Daily Note and Discharge 5    ICD-10: Treatment Diagnosis: Frontal lobe and executive function deficit R41.844   REFERRING PHYSICIAN: Alberta Lefort, MD MD Orders: evaluate and treat  Return Physician Appointment: 2018  PAST MEDICAL HISTORY:   Ms. Jeanne Arriaga is a 79 y.o. female who  has a past medical history of Abnormal EEG (2018); CAD (coronary artery disease); Falling (2018); Hallucination (2018); Hyperlipidemia (2015); Lewy body dementia without behavioral disturbance (2018); Mitral valve regurgitation (2015); Premature ventricular contractions (2015); REM sleep behavior disorder (2018); S/P MVR (mitral valve replacement) (); and Systolic CHF, acute on chronic ([facility] [address] (2015). She also  has a past surgical history that includes hx hysterectomy (); pr breast surgery procedure unlisted; hx tonsillectomy (age 17); hx heart catheterization (2015); and hx breast biopsy. MEDICAL/REFERRING DIAGNOSIS: Dementia in other diseases classified elsewhere without behavioral disturbance [F02.80]  Dementia with Lewy bodies [G31.83]  DATE OF ONSET: 2018   PRIOR LEVEL OF FUNCTION: Independent with ADL's  PRECAUTIONS/ALLERGIES: NKDA  ASSESSMENT:  Patient reports overall feeling well. States that she has a good and bad days. Otherwise she has no complaints. She has done well with most cognitive tasks with the exception of time. ST feels that patient's cognition is stable and she's responded well to Aricept therefore ST will d/c at this time. Patient will benefit from skilled intervention to address the above impairments. ?????? ? ? This section established at most recent assessment??????????  PROBLEM LIST (Impairments causing functional limitations):  1. Cognitive deficits  2. Executive functioning deficits  GOALS: (Goals have been discussed and agreed upon with patient.)  SHORT-TERM FUNCTIONAL GOALS: Time Frame: 3-6 months  1. Patient will complete immediate and delayed recall tasks with the use of strategies at Mod A with 50% accuracy. Met  2. Patient will complete following multi step directions at [address] with 80% accuracy. Partially Met  3. Patient will complete organization tasks at Mod A with 60% accuracy. Met  4. Patient will complete time and money tasks at Mod A with 60% accuracy. Not met  5. Patient will complete attention tasks at Mod A with 70% accuracy. Met  DISCHARGE GOALS: Time Frame: 12 weeks  1. Patient will increase cognitive and linguistic skills for ADL's across all setting at [address] with 80% accuracy. Met  REHABILITATION POTENTIAL FOR STATED GOALS: Good   SUBJECTIVE:  Alert but anxious  Present Symptoms: Memory loss   Pain Intensity 1: 0  Current Medications:   Current Outpatient Prescriptions on File Prior to Encounter   Medication Sig Dispense Refill    cholecalciferol (VITAMIN D3) 1,000 unit tablet Take  by mouth daily.  donepezil (ARICEPT) 5 mg tablet 1 tab by mouth in am 90 Tab 1    escitalopram oxalate (LEXAPRO) 20 mg tablet Take 1 Tab by mouth daily. 90 Tab 3    estradiol (CLIMARA) 0.0375 mg/24 hr 1 Patch by TransDERmal route Every Saturday. 12 Patch 3    atorvastatin (LIPITOR) 40 mg tablet Take 1 Tab by mouth daily. 90 Tab 3     No current facility-administered medications on file prior to encounter. Date Last Reviewed: 9/19/18  Social History/Home Situation:        Work/Activity History: retired    OBJECTIVE:  Objective Measure:   Tool Used: National Outcomes Measurement System: Functional Communication Measures: MEMORY  Score:  Initial: 4 Most Recent: X (Date: -- )   Interpretation of Tool: This measure describes the change in functional communication status subsequent to speech-language pathology treatment of patients who have memory deficits. o Level 1:  The individual is unable to recall any information, regardless of cueing. o Level 2: The individual consistently requires maximal verbal cues or uses external aids to recall personal information (e.g., family members, biographical information, physical location, etc.) in structured environments. o Level 3: The individual usually requires maximum cues to recall or use external aids for simple routine and personal information (e.g., schedule, names of familiar staff, location of therapy areas, etc.) in structured environments. o Level 4: The individual occasionally requires minimal cues to recall or use external memory aids for simple routine and personal information in structured environments. The individual requires consistent maximal cues to recall or use memory aids for complex and novel information (e.g., carry out multiple steps activities, accommodate schedule changes, anticipate meal times, etc.), plan and follow through on simple future events (e.g., use calendar to keep appointments, use log books to complete a single assignment/task, etc.) in structured environments. o Level 5 The individual consistently requires minimal cues to recall or use external memory aids for complex and novel information. The individual consistently requires minimal cues to plan and follow through on complex future events (e.g., menu planning and meal preparation, planning a party, etc.).  o Level 6: The individual is able to recall or use external aids/memory strategies for complex information and planning complex future events most of the time. When there is a breakdown in the use of recall/memory strategies/external memory aids, the individual occasionally requires minimal cues.   These breakdowns may occasionally interfere with the individuals functioning in vocational, avocational, and social activities. o Level 7: The individual is successful and independent in recalling or using external aids/memory strategies for complex information and planning future events in all vocational, avocational, and social activities. Score Level 7 Level 6 Level 5 Level 4 Level 3 Level 2 Level 1   Modifier CH CI CJ CK CL CM CN   ? Memory:     - CURRENT STATUS: CK - 40%-59% impaired, limited or restricted    - GOAL STATUS:  CI - 1%-19% impaired, limited or restricted    - D/C STATUS:  CI - 1%-19% impaired, limited or restricted           Oral Motor Structure/Speech:       SPEECH-LANGUAGE COGNITIVE EVALUATION  Tests Given:Ross Information Processing Assessment    Mental Status:    Neuro-Linguistics:     Completed memory recall tasks at [address] with 80% accuracy    Completed word finding tasks at Mod I 90% accuracy    Completed time management tasks with Mod to Max A due to decreased time reasoning    Completed delayed memory recall tasks at [address] with 85% accuracy              Cognitive Skills Activities: Activities/Procedures listed utilized to improve and/or restore cognitive function as related to attention to tasks, memory, sequencing, thought organization and compensatory strategies for recall. Required minimal visual, verbal, manual and   cueing to improve improve recall of information and improve ability to perform graded processes in steps.      __________________________________________________________________________________________________  Discharge this date. Total Treatment Duration:  Time In: 1115  Time Out: 1 [place], [place]. Jesús Oropeza

## 2018-09-26 ENCOUNTER — HOSPITAL ENCOUNTER (OUTPATIENT)
Dept: PHYSICAL THERAPY | Age: 68
End: 2018-09-26
Attending: PSYCHIATRY & NEUROLOGY
Payer: MEDICARE

## 2018-12-03 ENCOUNTER — APPOINTMENT (OUTPATIENT)
Dept: MRI IMAGING | Age: 68
End: 2018-12-03
Attending: HOSPITALIST
Payer: MEDICARE

## 2018-12-03 ENCOUNTER — APPOINTMENT (OUTPATIENT)
Dept: CT IMAGING | Age: 68
End: 2018-12-03
Attending: EMERGENCY MEDICINE
Payer: MEDICARE

## 2018-12-03 ENCOUNTER — HOSPITAL ENCOUNTER (OUTPATIENT)
Age: 68
Setting detail: OBSERVATION
Discharge: HOME | End: 2018-12-04
Attending: EMERGENCY MEDICINE | Admitting: HOSPITALIST
Payer: MEDICARE

## 2018-12-03 ENCOUNTER — APPOINTMENT (OUTPATIENT)
Dept: ULTRASOUND IMAGING | Age: 68
End: 2018-12-03
Attending: HOSPITALIST
Payer: MEDICARE

## 2018-12-03 DIAGNOSIS — R41.3 AMNESIA: Primary | ICD-10-CM

## 2018-12-03 PROBLEM — G93.41 ACUTE METABOLIC ENCEPHALOPATHY: Status: ACTIVE | Noted: 2018-12-03

## 2018-12-03 LAB
ANION GAP SERPL CALC-SCNC: 6 MMOL/L
APTT PPP: 32.5 SEC (ref 23.2–35.3)
ATRIAL RATE: 65 BPM
BACTERIA URNS QL MICRO: 0 /HPF
BASOPHILS # BLD: 0 K/UL (ref 0–0.2)
BASOPHILS NFR BLD: 0 % (ref 0–2)
BUN SERPL-MCNC: 21 MG/DL (ref 8–23)
CALCIUM SERPL-MCNC: 9.1 MG/DL (ref 8.3–10.4)
CALCULATED P AXIS, ECG09: 22 DEGREES
CALCULATED R AXIS, ECG10: 0 DEGREES
CALCULATED T AXIS, ECG11: 75 DEGREES
CASTS URNS QL MICRO: 0 /LPF
CHLORIDE SERPL-SCNC: 105 MMOL/L (ref 98–107)
CO2 SERPL-SCNC: 28 MMOL/L (ref 21–32)
CREAT SERPL-MCNC: 1.01 MG/DL (ref 0.6–1)
DIAGNOSIS, 93000: NORMAL
DIFFERENTIAL METHOD BLD: ABNORMAL
EOSINOPHIL # BLD: 0.1 K/UL (ref 0–0.8)
EOSINOPHIL NFR BLD: 2 % (ref 0.5–7.8)
EPI CELLS #/AREA URNS HPF: NORMAL /HPF
ERYTHROCYTE [DISTWIDTH] IN BLOOD BY AUTOMATED COUNT: 12.9 % (ref 11.9–14.6)
EST. AVERAGE GLUCOSE BLD GHB EST-MCNC: 105 MG/DL
GLUCOSE BLD STRIP.AUTO-MCNC: 91 MG/DL (ref 65–100)
GLUCOSE SERPL-MCNC: 89 MG/DL (ref 65–100)
HBA1C MFR BLD: 5.3 %
HCT VFR BLD AUTO: 47.9 % (ref 35.8–46.3)
HGB BLD-MCNC: 15.6 G/DL (ref 11.7–15.4)
IMM GRANULOCYTES # BLD: 0 K/UL (ref 0–0.5)
IMM GRANULOCYTES NFR BLD AUTO: 0 % (ref 0–5)
INR BLD: 1 (ref 0.9–1.2)
INR PPP: 0.9
LYMPHOCYTES # BLD: 1.4 K/UL (ref 0.5–4.6)
LYMPHOCYTES NFR BLD: 21 % (ref 13–44)
MCH RBC QN AUTO: 31.4 PG (ref 26.1–32.9)
MCHC RBC AUTO-ENTMCNC: 32.6 G/DL (ref 31.4–35)
MCV RBC AUTO: 96.4 FL (ref 79.6–97.8)
MONOCYTES # BLD: 0.5 K/UL (ref 0.1–1.3)
MONOCYTES NFR BLD: 7 % (ref 4–12)
NEUTS SEG # BLD: 4.7 K/UL (ref 1.7–8.2)
NEUTS SEG NFR BLD: 70 % (ref 43–78)
NRBC # BLD: 0 K/UL (ref 0–0.2)
P-R INTERVAL, ECG05: 166 MS
PHOSPHATE SERPL-MCNC: 2.8 MG/DL (ref 2.3–3.7)
PLATELET # BLD AUTO: 167 K/UL (ref 150–450)
PMV BLD AUTO: 11.7 FL (ref 9.4–12.3)
POTASSIUM SERPL-SCNC: 4.2 MMOL/L (ref 3.5–5.1)
PROTHROMBIN TIME: 12.5 SEC (ref 11.5–14.5)
PT BLD: 12.4 SECS (ref 9.6–11.6)
Q-T INTERVAL, ECG07: 432 MS
QRS DURATION, ECG06: 80 MS
QTC CALCULATION (BEZET), ECG08: 449 MS
RBC # BLD AUTO: 4.97 M/UL (ref 4.05–5.2)
RBC #/AREA URNS HPF: NORMAL /HPF
SODIUM SERPL-SCNC: 139 MMOL/L (ref 136–145)
TROPONIN I BLD-MCNC: 0 NG/ML (ref 0.02–0.05)
VENTRICULAR RATE, ECG03: 65 BPM
WBC # BLD AUTO: 6.7 K/UL (ref 4.3–11.1)
WBC URNS QL MICRO: NORMAL /HPF

## 2018-12-03 PROCEDURE — 84484 ASSAY OF TROPONIN QUANT: CPT

## 2018-12-03 PROCEDURE — 93880 EXTRACRANIAL BILAT STUDY: CPT

## 2018-12-03 PROCEDURE — 81015 MICROSCOPIC EXAM OF URINE: CPT

## 2018-12-03 PROCEDURE — 82962 GLUCOSE BLOOD TEST: CPT

## 2018-12-03 PROCEDURE — 70551 MRI BRAIN STEM W/O DYE: CPT

## 2018-12-03 PROCEDURE — 85025 COMPLETE CBC W/AUTO DIFF WBC: CPT

## 2018-12-03 PROCEDURE — 99218 HC RM OBSERVATION: CPT

## 2018-12-03 PROCEDURE — 85730 THROMBOPLASTIN TIME PARTIAL: CPT

## 2018-12-03 PROCEDURE — 80048 BASIC METABOLIC PNL TOTAL CA: CPT

## 2018-12-03 PROCEDURE — 99285 EMERGENCY DEPT VISIT HI MDM: CPT | Performed by: EMERGENCY MEDICINE

## 2018-12-03 PROCEDURE — 96372 THER/PROPH/DIAG INJ SC/IM: CPT

## 2018-12-03 PROCEDURE — 85610 PROTHROMBIN TIME: CPT

## 2018-12-03 PROCEDURE — 84100 ASSAY OF PHOSPHORUS: CPT

## 2018-12-03 PROCEDURE — 74011250636 HC RX REV CODE- 250/636: Performed by: HOSPITALIST

## 2018-12-03 PROCEDURE — 93005 ELECTROCARDIOGRAM TRACING: CPT | Performed by: EMERGENCY MEDICINE

## 2018-12-03 PROCEDURE — 70450 CT HEAD/BRAIN W/O DYE: CPT

## 2018-12-03 PROCEDURE — 77030020263 HC SOL INJ SOD CL0.9% LFCR 1000ML

## 2018-12-03 PROCEDURE — 83036 HEMOGLOBIN GLYCOSYLATED A1C: CPT

## 2018-12-03 PROCEDURE — 74011250637 HC RX REV CODE- 250/637: Performed by: HOSPITALIST

## 2018-12-03 PROCEDURE — C8929 TTE W OR WO FOL WCON,DOPPLER: HCPCS

## 2018-12-03 RX ORDER — ENOXAPARIN SODIUM 100 MG/ML
40 INJECTION SUBCUTANEOUS EVERY 24 HOURS
Status: DISCONTINUED | OUTPATIENT
Start: 2018-12-03 | End: 2018-12-04 | Stop reason: HOSPADM

## 2018-12-03 RX ORDER — HYDRALAZINE HYDROCHLORIDE 20 MG/ML
10 INJECTION INTRAMUSCULAR; INTRAVENOUS
Status: DISCONTINUED | OUTPATIENT
Start: 2018-12-03 | End: 2018-12-04 | Stop reason: HOSPADM

## 2018-12-03 RX ORDER — SODIUM CHLORIDE 0.9 % (FLUSH) 0.9 %
5-10 SYRINGE (ML) INJECTION EVERY 8 HOURS
Status: DISCONTINUED | OUTPATIENT
Start: 2018-12-03 | End: 2018-12-04 | Stop reason: HOSPADM

## 2018-12-03 RX ORDER — ATORVASTATIN CALCIUM 10 MG/1
20 TABLET, FILM COATED ORAL DAILY
Status: DISCONTINUED | OUTPATIENT
Start: 2018-12-04 | End: 2018-12-04 | Stop reason: HOSPADM

## 2018-12-03 RX ORDER — GUAIFENESIN 100 MG/5ML
81 LIQUID (ML) ORAL DAILY
Status: DISCONTINUED | OUTPATIENT
Start: 2018-12-03 | End: 2018-12-04 | Stop reason: HOSPADM

## 2018-12-03 RX ORDER — BISACODYL 5 MG
5 TABLET, DELAYED RELEASE (ENTERIC COATED) ORAL DAILY PRN
Status: DISCONTINUED | OUTPATIENT
Start: 2018-12-03 | End: 2018-12-04 | Stop reason: HOSPADM

## 2018-12-03 RX ORDER — ACETAMINOPHEN 325 MG/1
650 TABLET ORAL
Status: DISCONTINUED | OUTPATIENT
Start: 2018-12-03 | End: 2018-12-04 | Stop reason: HOSPADM

## 2018-12-03 RX ORDER — NALOXONE HYDROCHLORIDE 0.4 MG/ML
0.4 INJECTION, SOLUTION INTRAMUSCULAR; INTRAVENOUS; SUBCUTANEOUS AS NEEDED
Status: DISCONTINUED | OUTPATIENT
Start: 2018-12-03 | End: 2018-12-04 | Stop reason: HOSPADM

## 2018-12-03 RX ORDER — SODIUM CHLORIDE 0.9 % (FLUSH) 0.9 %
5-10 SYRINGE (ML) INJECTION AS NEEDED
Status: DISCONTINUED | OUTPATIENT
Start: 2018-12-03 | End: 2018-12-04 | Stop reason: HOSPADM

## 2018-12-03 RX ORDER — ONDANSETRON 2 MG/ML
4 INJECTION INTRAMUSCULAR; INTRAVENOUS
Status: DISCONTINUED | OUTPATIENT
Start: 2018-12-03 | End: 2018-12-04 | Stop reason: HOSPADM

## 2018-12-03 RX ADMIN — ASPIRIN 81 MG 81 MG: 81 TABLET ORAL at 17:51

## 2018-12-03 RX ADMIN — Medication 10 ML: at 17:51

## 2018-12-03 RX ADMIN — ENOXAPARIN SODIUM 40 MG: 40 INJECTION SUBCUTANEOUS at 17:52

## 2018-12-03 RX ADMIN — Medication 5 ML: at 21:52

## 2018-12-03 NOTE — ED NOTES
TRANSFER - OUT REPORT: 
 
Verbal report given to Munira(name) on Víctor Sprague  being transferred to [facility](unit) for routine progression of care, also advised of NIH score and that dysphagia screening has been performed. Report consisted of patients Situation, Background, Assessment and  
Recommendations(SBAR). Information from the following report(s) SBAR was reviewed with the receiving nurse. Lines:    
 
Opportunity for questions and clarification was provided.

## 2018-12-03 NOTE — PROGRESS NOTES
TRANSFER - IN REPORT: 
 
Verbal report received from Nestor Reese RN(name) on [facility]  being received from [facility](unit) for routine progression of care Report consisted of patients Situation, Background, Assessment and  
Recommendations(SBAR). Information from the following report(s) SBAR was reviewed with the receiving nurse. Opportunity for questions and clarification was provided. Assessment will be completed upon patients arrival to unit and care will be assumed.

## 2018-12-03 NOTE — H&P
H&P 
 
 
Patient: Washington Phan               Sex: female             MRN: [id number] YOB: 1950      Age:  79 y.o. Chief Complaint: Confused and Disoriented HPI This is a 72-year-old female with a past medical history significant for mitral valve regurgitation status post mitral valve replacement, Lewy body dementia, coronary artery disease, depression presented to the emergency room for being confused and disoriented. Patient went to mall today and then she got tensed and was unable to remember things and she walked up to a stranger and asked for help. She was able to remember her family members but could not give her the phone numbers, could not give the address. By the time she was brought into the emergency room her forgetfulness was improved but still does not feel back to her baseline. Patient denies any headache, no tingling or numbness in the extremities, never had a similar symptoms in the past but she does was diagnosed with Jamil body dementia. She also complained of some imbalance in the gait today. In the ER evaluation patient had a telemetry neurology consulted, a code stroke was called, CT of the head did not show any evidence of acute findings, currently she is being admitted for to rule out acute stroke. Review of Systems Comprehensive 10 point ROS is done, and pertinent positives & negatives per HPI, rest of them are negative. Past Medical History:  
Diagnosis Date  Abnormal EEG 7/30/2018  CAD (coronary artery disease)   
 minimal non obstructive CAD with severe Mitral valve regurg  Falling 6/19/2018  Hallucination 6/19/2018  Hyperlipidemia 9/29/2015  Lewy body dementia without behavioral disturbance 6/19/2018  Mitral valve regurgitation 7/21/2015  
 7/21/15 (Dr Chivo Oneal) Mini mitral through a right anterior thoracotomy with bifemoral cannulation, mitral valve replacement with a 29 mm Magna Ease Isabel pericardial valve.  Premature ventricular contractions 9/29/2015 1. Holter (10/24/14): 16,979 PVC in 24 hours. Total of 87,980 beats.  REM sleep behavior disorder 6/19/2018  S/P MVR (mitral valve replacement) 7/21/2015  Systolic CHF, acute on chronic ([facility] [address] 07/22/2015 EF 45%- 2015 Past Surgical History:  
Procedure Laterality Date  BREAST SURGERY PROCEDURE UNLISTED    
 paula breast bx , benign  HX BREAST BIOPSY  HX HEART CATHETERIZATION  7/14/2015  HX HYSTERECTOMY  2003  HX TONSILLECTOMY  age 17  
 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  
Social History Narrative . Allergies Allergen Reactions  Nucynta [Tapentadol] Other (comments) Hallucinations and Paranoia  Percocet [Oxycodone-Acetaminophen] Other (comments) Hallucination and paranoia  Tramadol Palpitations Hallucinations Prior to Admission medications Medication Sig Start Date End Date Taking? Authorizing Provider  
cholecalciferol (VITAMIN D3) 1,000 unit tablet Take  by mouth daily. Provider, Historical  
donepezil (ARICEPT) 5 mg tablet 1 tab by mouth in am 7/17/18   Jesenia Villarreal MD  
escitalopram oxalate (LEXAPRO) 20 mg tablet Take 1 Tab by mouth daily. 5/2/18   Vernon Milton MD  
estradiol (CLIMARA) 0.0375 mg/24 hr 1 Patch by TransDERmal route Every Saturday.  4/14/18   Vernon Milton MD  
 atorvastatin (LIPITOR) 40 mg tablet Take 1 Tab by mouth daily. 2/15/18   Lisy Bajwa MD  
 
 
 
Physical Exam  
 
Visit Vitals /70 Pulse 67 Temp 98.2 °F (36.8 °C) Resp 18 Ht 5' 2\" (1.575 m) Wt 54.4 kg (120 lb) SpO2 100% BMI 21.95 kg/m² Temp (24hrs), Av.2 °F (36.8 °C), Min:98.2 °F (36.8 °C), Max:98.2 °F (36.8 °C) Oxygen Therapy O2 Sat (%): 100 % (18 1352) Pulse via Oximetry: 67 beats per minute (18 1352) O2 Device: Room air (18 1320) No intake or output data in the 24 hours ending 18 1451 General:- Conscious, No acute distress Eyes:- No pallor/icterus HENT- Oral Mucosa is Moist, Neck:- Supple, No JVD Lungs- CTA Bilaterally, No significant wheezing Heart:- S1 S2 regular Abdomen:- Soft, Positive bowel sounds, NTND, No guarding/rigidity/rebound tend. Extremities:-No pedal edema Neurologic: - AOX2, No acute FND, Skin: - No acute rashes Musculoskeletal: No Acute findings Psych: - Appropriate mood LAB Recent Results (from the past 24 hour(s)) POC TROPONIN-I Collection Time: 18  1:24 PM  
Result Value Ref Range Troponin-I (POC) 0 (L) 0.02 - 0.05 ng/ml CBC WITH AUTOMATED DIFF Collection Time: 18  1:25 PM  
Result Value Ref Range WBC 6.7 4.3 - 11.1 K/uL  
 RBC 4.97 4.05 - 5.2 M/uL  
 HGB 15.6 (H) 11.7 - 15.4 g/dL HCT 47.9 (H) 35.8 - 46.3 % MCV 96.4 79.6 - 97.8 FL  
 MCH 31.4 26.1 - 32.9 PG  
 MCHC 32.6 31.4 - 35.0 g/dL  
 RDW 12.9 11.9 - 14.6 % PLATELET 161 739 - 311 K/uL MPV 11.7 9.4 - 12.3 FL ABSOLUTE NRBC 0.00 0.0 - 0.2 K/uL  
 DF AUTOMATED NEUTROPHILS 70 43 - 78 % LYMPHOCYTES 21 13 - 44 % MONOCYTES 7 4.0 - 12.0 % EOSINOPHILS 2 0.5 - 7.8 % BASOPHILS 0 0.0 - 2.0 % IMMATURE GRANULOCYTES 0 0.0 - 5.0 %  
 ABS. NEUTROPHILS 4.7 1.7 - 8.2 K/UL  
 ABS. LYMPHOCYTES 1.4 0.5 - 4.6 K/UL  
 ABS. MONOCYTES 0.5 0.1 - 1.3 K/UL  
 ABS. EOSINOPHILS 0.1 0.0 - 0.8 K/UL ABS. BASOPHILS 0.0 0.0 - 0.2 K/UL  
 ABS. IMM. GRANS. 0.0 0.0 - 0.5 K/UL METABOLIC PANEL, BASIC Collection Time: 12/03/18  1:25 PM  
Result Value Ref Range Sodium 139 136 - 145 mmol/L Potassium 4.2 3.5 - 5.1 mmol/L Chloride 105 98 - 107 mmol/L  
 CO2 28 21 - 32 mmol/L Anion gap 6 mmol/L Glucose 89 65 - 100 mg/dL BUN 21 8 - 23 MG/DL Creatinine 1.01 (H) 0.6 - 1.0 MG/DL  
 GFR est AA >60 >60 ml/min/1.73m2 GFR est non-AA 58 ml/min/1.73m2 Calcium 9.1 8.3 - 10.4 MG/DL PROTHROMBIN TIME + INR Collection Time: 12/03/18  1:25 PM  
Result Value Ref Range Prothrombin time 12.5 11.5 - 14.5 sec INR 0.9 PTT Collection Time: 12/03/18  1:25 PM  
Result Value Ref Range aPTT 32.5 23.2 - 35.3 SEC PHOSPHORUS Collection Time: 12/03/18  1:25 PM  
Result Value Ref Range Phosphorus 2.8 2.3 - 3.7 MG/DL  
POC PT/INR Collection Time: 12/03/18  1:25 PM  
Result Value Ref Range Prothrombin time (POC) 12.4 (H) 9.6 - 11.6 SECS  
 INR (POC) 1.0 0.9 - 1.2 GLUCOSE, POC Collection Time: 12/03/18  1:25 PM  
Result Value Ref Range Glucose (POC) 91 65 - 100 mg/dL IMAGING:  
 
Ct Head Wo Cont Result Date: 12/3/2018 IMPRESSION: 1. No evidence of acute intracranial abnormality. All Micro Results None Assessment/Plan Active Problems: * No active hospital problems. * #Transient global amnesia/disorientation #Lewy body dementia #Coronary artery disease with a history of mitral valve regurgitation status post mitral valve replacement #Coronary disease #History of depression Plan Patient will be admitted under observation status to rule out acute stroke, appreciate telemetry neurology evaluation, will order aspirin 81 mg once daily, Lipitor 40 mg once daily, check LDL cholesterol level, order MRI, echocardiogram, carotid duplex Order neuro checks every 4 hours, PT OT evaluation Continue rest of other home medications 1 Aricept 5 mg in a.m., Lexapro 20 mg once daily DVT prophylaxis with Lovenox Manjit Sue MD 
December 3, 2018

## 2018-12-03 NOTE — PROGRESS NOTES
Visited with patient. States she lives at home with her  and is completely independent in all ADLs and still drives. States she uses nothing for ambulation assistance. States she has two grown children locally and that her  and daughter are picking up her car at the mall now. States it may take them a while to get here because she can't remember where she parked it. No needs identified at this time. Please consult  if any new issues arise.

## 2018-12-03 NOTE — ED PROVIDER NOTES
79year old lady presents with concerns about being confused and disorientated. She was apparently at the mall today when she couldn't member where she was or what was going on. She walked up to a stranger and asked for help. EMS was eventually called and transported her here for evaluation. She is unable to remember anything about today or what she had for dinner last night. She could not recall her children's birthdates but did remember their names. She denies any recent injury, but I am uncertain about the veracity of her history. Elements of this note were created using speech recognition software. As such, errors of speech recognition may be present. Past Medical History:  
Diagnosis Date  Abnormal EEG 7/30/2018  CAD (coronary artery disease)   
 minimal non obstructive CAD with severe Mitral valve regurg  Falling 6/19/2018  Hallucination 6/19/2018  Hyperlipidemia 9/29/2015  Lewy body dementia without behavioral disturbance 6/19/2018  Mitral valve regurgitation 7/21/2015  
 7/21/15 (Dr Severo Villavicencio) Mini mitral through a right anterior thoracotomy with bifemoral cannulation, mitral valve replacement with a 29 mm Magna Ease Isabel pericardial valve.  Premature ventricular contractions 9/29/2015 1. Holter (10/24/14): 16,979 PVC in 24 hours. Total of 87,980 beats.  REM sleep behavior disorder 6/19/2018  S/P MVR (mitral valve replacement) 7/21/2015  Systolic CHF, acute on chronic ([facility] [address] 07/22/2015 EF 45%- 2015 Past Surgical History:  
Procedure Laterality Date  BREAST SURGERY PROCEDURE UNLISTED    
 paula breast bx , benign  HX BREAST BIOPSY  HX HEART CATHETERIZATION  7/14/2015  HX HYSTERECTOMY  2003  HX TONSILLECTOMY  age 17  
 and adenoids Family History:  
Problem Relation Age of Onset  Hypertension Mother  Heart Disease Mother  Heart Disease Father Social History Socioeconomic History  Marital status:  Spouse name: Not on file  Number of children: Not on file  Years of education: Not on file  Highest education level: Not on file Social Needs  Financial resource strain: Not on file  Food insecurity - worry: Not on file  Food insecurity - inability: Not on file  Transportation needs - medical: Not on file  Transportation needs - non-medical: Not on file Occupational History  Occupation: works at tanning [employer] Tobacco Use  Smoking status: Never Smoker  Smokeless tobacco: Never Used Substance and Sexual Activity  Alcohol use: Yes Comment: occasionally  Drug use: No  
 Sexual activity: Not on file Other Topics Concern  Not on file Social History Narrative . ALLERGIES: Nucynta [tapentadol]; Percocet [oxycodone-acetaminophen]; and Tramadol Review of Systems Unable to perform ROS: Mental status change (memory loss) Vitals:  
 12/03/18 1312 BP: 163/87 Pulse: 66 Resp: 18 Temp: 98.2 °F (36.8 °C) SpO2: 99% Weight: 54.4 kg (120 lb) Height: 5' 2\" (1.575 m) Physical Exam  
Constitutional: She is oriented to person, place, and time. She appears well-developed and well-nourished. HENT:  
Head: Normocephalic and atraumatic. Mouth/Throat: Oropharynx is clear and moist.  
Eyes: Conjunctivae are normal. Pupils are equal, round, and reactive to light. Right eye exhibits no discharge. Left eye exhibits no discharge. Neck: No thyromegaly present. Cardiovascular: Normal rate, regular rhythm and normal heart sounds. No murmur heard. Pulmonary/Chest: Effort normal and breath sounds normal.  
Abdominal: Soft. Bowel sounds are normal. There is no tenderness. There is no rebound and no guarding. Musculoskeletal: Normal range of motion. She exhibits no edema. Neurological: She is alert and oriented to person, place, and time. She exhibits normal muscle tone.  Coordination normal.  
 ppatient has 5 out 5 strength in both legs and arms with no drift. Her speech is fluent and clear. Skin: Skin is warm and dry. Psychiatric: She has a normal mood and affect. Her behavior is normal.  
  
 
MDM Number of Diagnoses or Management Options Diagnosis management comments: Patient's symptoms seem most consistent with transient global amnesia. However, I will to a code stroke and seek neurology evaluation. I discussed the case with the telemetry Neurology. Patient is not a TPA candidate. I will plan to admit for observation and consideration of MRI. 
 
2:13 PM 
I spoke with the hospitalist who kindly agreed to see the patient. Procedures

## 2018-12-03 NOTE — ED TRIAGE NOTES
Patient was at mall when she approached a stranger and could not remember where she was or how she got there. EMS advises that patient now remembers driving to mall, however some mild confusion at this time. Patient denies any complaints of pain at this time.

## 2018-12-04 VITALS
HEIGHT: 62 IN | RESPIRATION RATE: 18 BRPM | BODY MASS INDEX: 22.08 KG/M2 | WEIGHT: 120 LBS | HEART RATE: 65 BPM | TEMPERATURE: 97.5 F | OXYGEN SATURATION: 98 % | DIASTOLIC BLOOD PRESSURE: 77 MMHG | SYSTOLIC BLOOD PRESSURE: 119 MMHG

## 2018-12-04 LAB
CHOLEST SERPL-MCNC: 229 MG/DL
HDLC SERPL-MCNC: 65 MG/DL (ref 40–60)
HDLC SERPL: 3.5 {RATIO}
LDLC SERPL CALC-MCNC: 145 MG/DL
LIPID PROFILE,FLP: ABNORMAL
TRIGL SERPL-MCNC: 95 MG/DL (ref 35–150)
VLDLC SERPL CALC-MCNC: 19 MG/DL (ref 6–23)

## 2018-12-04 PROCEDURE — G8979 MOBILITY GOAL STATUS: HCPCS

## 2018-12-04 PROCEDURE — 97161 PT EVAL LOW COMPLEX 20 MIN: CPT

## 2018-12-04 PROCEDURE — G8980 MOBILITY D/C STATUS: HCPCS

## 2018-12-04 PROCEDURE — G8978 MOBILITY CURRENT STATUS: HCPCS

## 2018-12-04 PROCEDURE — G8987 SELF CARE CURRENT STATUS: HCPCS

## 2018-12-04 PROCEDURE — 36415 COLL VENOUS BLD VENIPUNCTURE: CPT

## 2018-12-04 PROCEDURE — G8989 SELF CARE D/C STATUS: HCPCS

## 2018-12-04 PROCEDURE — G8988 SELF CARE GOAL STATUS: HCPCS

## 2018-12-04 PROCEDURE — 80061 LIPID PANEL: CPT

## 2018-12-04 PROCEDURE — 97165 OT EVAL LOW COMPLEX 30 MIN: CPT

## 2018-12-04 PROCEDURE — 99218 HC RM OBSERVATION: CPT

## 2018-12-04 PROCEDURE — 74011250637 HC RX REV CODE- 250/637: Performed by: HOSPITALIST

## 2018-12-04 RX ORDER — GUAIFENESIN 100 MG/5ML
81 LIQUID (ML) ORAL DAILY
Qty: 1 TAB | Refills: 0 | Status: SHIPPED | OUTPATIENT
Start: 2018-12-05 | End: 2019-02-20

## 2018-12-04 RX ADMIN — Medication 10 ML: at 05:27

## 2018-12-04 RX ADMIN — ASPIRIN 81 MG 81 MG: 81 TABLET ORAL at 09:59

## 2018-12-04 NOTE — PROGRESS NOTES
Care Management Interventions Transition of Care Consult (CM Consult): Discharge Planning Current Support Network: Own Home, Lives with Spouse Confirm Follow Up Transport: Family Discharge Location Discharge Placement: Home SW spoke with pt who is A&O. Pt admitted to rule out a CVA. Pt's MRI is negative. Pt is up indep in her room. Pt lives with spouse. At this time do not anticipate any d/c needs.

## 2018-12-04 NOTE — PROGRESS NOTES
Problem: Mobility Impaired (Adult and Pediatric) Goal: *Acute Goals and Plan of Care (Insert Text) No goals set. Outcome: Resolved/Met Date Met: 12/04/18 PHYSICAL THERAPY: Initial Assessment, Discharge 12/4/2018OBSERVATION: Hospital Day: 2 [place] / [place] / Product Type: Medicare /  
  
NAME/AGE/GENDER: Sandeep Cifuentes is a 79 y.o. female PRIMARY DIAGNOSIS: Acute metabolic encephalopathy [M82.35] <principal problem not specified> <principal problem not specified> 
 
  
ICD-10: Treatment Diagnosis: · Difficulty in walking, Not elsewhere classified (R26.2) · Other abnormalities of gait and mobility (R26.89) Precaution/Allergies: 
Nucynta [tapentadol]; Percocet [oxycodone-acetaminophen]; and Tramadol ASSESSMENT:  
Ms. Tomás Mendez presents with no functional deficits. Pt performed sit to stand and ambulated in hallway. Pt sitting in bedside chair with needs in reach. This section established at most recent assessment PROBLEM LIST (Impairments causing functional limitations): 1. none INTERVENTIONS PLANNED: (Benefits and precautions of physical therapy have been discussed with the patient.) 1. none TREATMENT PLAN: Frequency/Duration: daily for duration of hospital stay Rehabilitation Potential For Stated Goals: none RECOMMENDED REHABILITATION/EQUIPMENT: (at time of discharge pending progress): Due to the probability of continued deficits (see above) this patient will not likely need continued skilled physical therapy after discharge. Equipment:  
? None at this time HISTORY:  
History of Present Injury/Illness (Reason for Referral): Pt with above diagnosis.  
Past Medical History/Comorbidities:  
Ms. Tomás Mendez  has a past medical history of Abnormal EEG, CAD (coronary artery disease), Falling, Hallucination, Hyperlipidemia, Lewy body dementia without behavioral disturbance, Mitral valve regurgitation, Premature ventricular contractions, REM sleep behavior disorder, S/P MVR (mitral valve replacement), and Systolic CHF, acute on chronic ([facility] [address] Ms. Alvaro Roman  has a past surgical history that includes hx hysterectomy (2003); pr breast surgery procedure unlisted; hx tonsillectomy (age 17); hx heart catheterization (2015); hx breast biopsy; MITRAL VALVE REPLACEMENT MINIMALLY INVASIVE   (N/A, 2015); and ESOPHAGEAL TRANS ECHOCARDIOGRAM (N/A, 2015). Social History/Living Environment:  
Home Environment: Private residence # Steps to Enter: 2 One/Two Story Residence: Two story Living Alone: No 
Support Systems: Family member(s) Patient Expects to be Discharged to[de-identified] Other (comment)(Condo) Current DME Used/Available at Home: None Tub or Shower Type: Shower Prior Level of Function/Work/Activity: 
Independent with functional mobility. Number of Personal Factors/Comorbidities that affect the Plan of Care: 0: LOW COMPLEXITY EXAMINATION:  
Most Recent Physical Functioning:  
Gross Assessment: 
AROM: Generally decreased, functional 
Strength: Generally decreased, functional 
Coordination: Generally decreased, functional 
Tone: Normal 
Sensation: Intact Posture: 
Posture (WDL): Within defined limits Balance: 
Sitting: Intact Standing: Intact Bed Mobility: 
  
Wheelchair Mobility: 
  
Transfers: 
Sit to Stand: Independent Stand to Sit: Independent Gait: 
  
Distance (ft): 150 Feet (ft) Ambulation - Level of Assistance: Independent Body Structures Involved: 1. None Body Functions Affected: 1. None Activities and Participation Affected: 1. None Number of elements that affect the Plan of Care: 1-2: LOW COMPLEXITY CLINICAL PRESENTATION:  
Presentation: Stable and uncomplicated: LOW COMPLEXITY CLINICAL DECISION MAKIN [facility] [address] [id number] AM-PAC 6 Clicks Basic Mobility Inpatient Short Form How much difficulty does the patient currently have. .. Unable A Lot A Little None 1.  Turning over in bed (including adjusting bedclothes, sheets and blankets)? [] 1   [] 2   [x] 3   [] 4  
2. Sitting down on and standing up from a chair with arms ( e.g., wheelchair, bedside commode, etc.)   [] 1   [] 2   [x] 3   [] 4  
3. Moving from lying on back to sitting on the side of the bed? [] 1   [] 2   [x] 3   [] 4 How much help from another person does the patient currently need. .. Total A Lot A Little None 4. Moving to and from a bed to a chair (including a wheelchair)? [] 1   [] 2   [x] 3   [] 4  
5. Need to walk in hospital room? [] 1   [] 2   [x] 3   [] 4  
6. Climbing 3-5 steps with a railing? [] 1   [] 2   [x] 3   [] 4  
© 2007, Trustees of [address] [place] [address], under license to [employer]. All rights reserved Score:  Initial: 18 Most Recent: X (Date: -- ) Interpretation of Tool:  Represents activities that are increasingly more difficult (i.e. Bed mobility, Transfers, Gait). Score 24 23 22-20 19-15 14-10 9-7 6 Modifier CH CI CJ CK CL CM CN   
 
? Mobility - Walking and Moving Around:  
  - CURRENT STATUS: CK - 40%-59% impaired, limited or restricted  - GOAL STATUS: CK - 40%-59% impaired, limited or restricted  - D/C STATUS:  CK - 40%-59% impaired, limited or restricted [place] / Product Type: Medicare /   
 
Medical Necessity:    
· no skilled needs. Reason for Services/Other Comments: 
· no skilled needs. Use of outcome tool(s) and clinical judgement create a POC that gives a: Clear prediction of patient's progress: LOW COMPLEXITY  
  
 
 
 
TREATMENT:  
(In addition to Assessment/Re-Assessment sessions the following treatments were rendered) Pre-treatment Symptoms/Complaints:   
Pain: Initial:  
Pain Intensity 1: 0  Post Session:  none Assessment/Reassessment only, no treatment provided today Braces/Orthotics/Lines/Etc:  
· O2 Device: Room air Treatment/Session Assessment: · Response to Treatment:  Pt agreeable to ambulate in hallway. · Interdisciplinary Collaboration:  
o Physical Therapist 
o Registered Nurse · After treatment position/precautions:  
o Bed/Chair-wheels locked 
o Bed in low position 
o Call light within reach 
o RN notified · Compliance with Program/Exercises: no program 
· Recommendations/Intent for next treatment session:  None indicated. Total Treatment Duration: PT Patient Time In/Time Out Time In: 3592 Time Out: 0930 Maury Amaya PT

## 2018-12-04 NOTE — PROGRESS NOTES
Problem: Self Care Deficits Care Plan (Adult) Goal: *Acute Goals and Plan of Care (Insert Text) OCCUPATIONAL THERAPY: Initial Assessment and Discharge 12/4/2018OBSERVATION: Hospital Day: 2 [place] / Product Type: Medicare /  
  
NAME/AGE/GENDER: Lesa Jacques is a 79 y.o. female PRIMARY DIAGNOSIS:  Acute metabolic encephalopathy [D79.33] <principal problem not specified> <principal problem not specified> 
 
  
ICD-10: Treatment Diagnosis:  
 · Other lack of cordination (R27.8) Precautions/Allergies: 
  Nucynta [tapentadol]; Percocet [oxycodone-acetaminophen]; and Tramadol ASSESSMENT:  
Ms. Ilene Vela admitted with above diagnosis; pt independent with ADLs and functional mobility. No skilled OT indicated at this time. Will discharge OT. This section established at most recent assessment PROBLEM LIST (Impairments causing functional limitations): 1. Discharge OT INTERVENTIONS PLANNED: (Benefits and precautions of occupational therapy have been discussed with the patient.) Discharge OT  
TREATMENT PLAN: Frequency/Duration: discharge OTRehabilitation Potential For Stated Goals: discharge OT  
 
RECOMMENDED REHABILITATION/EQUIPMENT: (at time of discharge pending progress): Due to the probability of continued deficits (see above) this patient will not likely need continued skilled occupational therapy after discharge. Equipment:  
? None at this time OCCUPATIONAL PROFILE AND HISTORY:  
History of Present Injury/Illness (Reason for Referral): 
53-year-old female with a past medical history significant for mitral valve regurgitation status post mitral valve replacement, Lewy body dementia, coronary artery disease, depression presented to the emergency room for being confused and disoriented.   Patient went to mall today and then she got tensed and was unable to remember things and she walked up to a stranger and asked for help. She was able to remember her family members but could not give her the phone numbers, could not give the address. By the time she was brought into the emergency room her forgetfulness was improved but still does not feel back to her baseline. Patient denies any headache, no tingling or numbness in the extremities, never had a similar symptoms in the past but she does was diagnosed with Jamil body dementia. She also complained of some imbalance in the gait today. In the ER evaluation patient had a telemetry neurology consulted, a code stroke was called, CT of the head did not show any evidence of acute findings, currently she is being admitted for to rule out acute stroke Past Medical History/Comorbidities:  
Ms. Chey Gonzalez  has a past medical history of Abnormal EEG, CAD (coronary artery disease), Falling, Hallucination, Hyperlipidemia, Lewy body dementia without behavioral disturbance, Mitral valve regurgitation, Premature ventricular contractions, REM sleep behavior disorder, S/P MVR (mitral valve replacement), and Systolic CHF, acute on chronic ([facility] [address] Ms. Chey Gonzalez  has a past surgical history that includes hx hysterectomy (2003); pr breast surgery procedure unlisted; hx tonsillectomy (age 17); hx heart catheterization (7/14/2015); hx breast biopsy; MITRAL VALVE REPLACEMENT MINIMALLY INVASIVE   (N/A, 7/21/2015); and ESOPHAGEAL TRANS ECHOCARDIOGRAM (N/A, 7/21/2015). Social History/Living Environment:  
Home Environment: Private residence # Steps to Enter: 14 One/Two Story Residence: Two story Living Alone: No 
Support Systems: Family member(s) Patient Expects to be Discharged to[de-identified] Other (comment)(Condo) Current DME Used/Available at Home: None Tub or Shower Type: Shower Prior Level of Function/Work/Activity: 
Independent Number of Personal Factors/Comorbidities that affect the Plan of Care: Brief history (0):  LOW COMPLEXITY ASSESSMENT OF OCCUPATIONAL PERFORMANCE[de-identified]  
 Activities of Daily Living:  
Basic ADLs (From Assessment) Complex ADLs (From Assessment) Feeding: Independent Oral Facial Hygiene/Grooming: Independent Bathing: Independent Upper Body Dressing: Independent Lower Body Dressing: Independent Toileting: Independent Grooming/Bathing/Dressing Activities of Daily Living Functional Transfers Bathroom Mobility: Independent Toilet Transfer : Independent Shower Transfer: Independent Bed/Mat Mobility Rolling: Independent Supine to Sit: Independent Sit to Supine: Independent Sit to Stand: Independent Bed to Chair: Independent Scooting: Independent Most Recent Physical Functioning:  
Gross Assessment: 
AROM: Within functional limits(BUE) Strength: Within functional limits(BUE) Coordination: Within functional limits(BUE) Tone: Normal 
Sensation: Intact Posture: 
  
Balance: 
Sitting: Intact Standing: Intact Bed Mobility: 
Rolling: Independent Supine to Sit: Independent Sit to Supine: Independent Scooting: Independent Wheelchair Mobility: 
  
Transfers: 
Sit to Stand: Independent Stand to Sit: Independent Bed to Chair: Independent Patient Vitals for the past 6 hrs: 
 BP BP Patient Position SpO2 Pulse 12/04/18 0406 98/66  95 % 67  
12/04/18 0755 119/74 At rest 98 % 63 Mental Status Neurologic State: Alert Orientation Level: Oriented X4 Cognition: Follows commands Perception: Appears intact Perseveration: No perseveration noted Physical Skills Involved: 
1. Balance 2. Strength 3. Activity Tolerance Cognitive Skills Affected (resulting in the inability to perform in a timely and safe manner): 1. none Psychosocial Skills Affected: 1. none Number of elements that affect the Plan of Care: 1-3:  LOW COMPLEXITY CLINICAL DECISION MAKING:  
[employer] AM-[employer]AC 6 Clicks Daily Activity Inpatient Short Form How much help from another person does the patient currently need. .. Total A Lot A Little None 1. Putting on and taking off regular lower body clothing? [] 1   [] 2   [] 3   [x] 4  
2. Bathing (including washing, rinsing, drying)? [] 1   [] 2   [] 3   [x] 4  
3. Toileting, which includes using toilet, bedpan or urinal?   [] 1   [] 2   [] 3   [x] 4  
4. Putting on and taking off regular upper body clothing? [] 1   [] 2   [] 3   [x] 4  
5. Taking care of personal grooming such as brushing teeth? [] 1   [] 2   [] 3   [x] 4  
6. Eating meals? [] 1   [] 2   [] 3   [x] 4  
© 2007, Trustees of [facility] [employer], under license to [employer]. All rights reserved Score:  Initial: 24 Most Recent: X (Date: -- ) Interpretation of Tool:  Represents activities that are increasingly more difficult (i.e. Bed mobility, Transfers, Gait). Score 24 23 22-20 19-15 14-10 9-7 6 Modifier CH CI CJ CK CL CM CN   
 
? Self Care:  
  - CURRENT STATUS: CH - 0% impaired, limited or restricted  - GOAL STATUS: CH - 0% impaired, limited or restricted  - D/C STATUS:  CH - 0% impaired, limited or restricted [place] / [place] / Product Type: Medicare /   
 
Medical Necessity: · Discharge OT Reason for Services/Other Comments: discharge OT Use of outcome tool(s) and clinical judgement create a POC that gives a: LOW COMPLEXITY  
 
 
 
TREATMENT:  
(In addition to Assessment/Re-Assessment sessions the following treatments were rendered) Pre-treatment Symptoms/Complaints:  No complaints Pain: Initial:  
Pain Intensity 1: 0  Post Session:  0 Assessment/Reassessment only, no treatment provided today Braces/Orthotics/Lines/Etc:  
· O2 Device: Room air Treatment/Session Assessment:   
· Response to Treatment:  Tolerated well · Interdisciplinary Collaboration:  
o Physical Therapist 
o Occupational Therapist 
o Registered Nurse · After treatment position/precautions:  
o Up in chair 
o Bed/Chair-wheels locked 
o Call light within reach 
o RN notified · Compliance with Program/Exercises:discharge OT · Recommendations/Intent for next treatment session: discharge OT Total Treatment Duration: OT Patient Time In/Time Out Time In: 3277 Time Out: 2050 Feliciano Maza, OT

## 2018-12-04 NOTE — PROGRESS NOTES
Shift assessment complete. A&O x 4. No complaints at this time. Neuro checks WDL. No distress noted. Respirations even and unlabored. Call light within each.

## 2018-12-04 NOTE — PROGRESS NOTES
Discharge instructions were reviewed with the patient and her dtr. Opportunity for questions given. Patient verbalized understanding of discharge and follow up instructions, as well as S/S to report to MD or return to ER for. PIV was removed. Patient will D/C to home.

## 2018-12-04 NOTE — DISCHARGE SUMMARY
Hospitalist Discharge Summary     Patient ID:  Víctor Sprague  [id number]  87 y.o.  1950  Admit date: 12/3/2018  1:11 PM  Discharge date and time: 12/4/2018  Attending: Cezar Navarrete MD  PCP:  Sam Greenfield MD  Treatment Team: Attending Provider: Cezar Navarrete MD; Utilization Review: Mort Marrow    Principal Diagnosis <principal problem not specified>   Active Problems:    Acute metabolic encephalopathy (54/3/6778)             Hospital Course:  Please refer to the admission H&P for details of presentation. In summary, 53-year-old female with a past medical history significant for mitral valve regurgitation status post mitral valve replacement, Lewy body dementia, coronary artery disease, depression presented to the emergency room with transient confusion/disorientation while at the mall. Teleneuro consulted in ED and code stroke called. CT head, MRI, ECHO, carotids all without acute findings. Pt's symptoms have completely resolved. She is being discharged with ASA 81mg, instructed to resume Lipitor which she had previously stopped taking, and recommended to f/u with her neurologist in 1-2 weeks. Significant Diagnostic Studies:   DUPLEX CAROTID BILATERAL   Final Result   IMPRESSION:  No evidence of hemodynamically significant stenosis in either   carotid artery. MRI BRAIN WO CONT   Final Result   IMPRESSION: No acute intracranial abnormality. Specifically, no acute CVA. CT HEAD WO CONT   Final Result   IMPRESSION:   1. No evidence of acute intracranial abnormality. Labs: Results:       Chemistry Recent Labs     12/03/18  1325   GLU 89      K 4.2      CO2 28   BUN 21   CREA 1.01*   CA 9.1   AGAP 6      CBC w/Diff Recent Labs     12/03/18  1325   WBC 6.7   RBC 4.97   HGB 15.6*   HCT 47.9*      GRANS 70   LYMPH 21   EOS 2      Cardiac Enzymes No results for input(s): CPK, CKND1, KATHRINE in the last 72 hours.     No lab exists for component: Ellie Gregorio Coagulation Recent Labs     12/03/18  1325   PTP 12.5   INR 0.9  1.0   APTT 32.5       Lipid Panel Lab Results   Component Value Date/Time    Cholesterol, total 229 12/04/2018 05:48 AM    HDL Cholesterol 65 (H) 12/04/2018 05:48 AM    LDL, calculated 145 (H) 12/04/2018 05:48 AM    VLDL, calculated 19 12/04/2018 05:48 AM    Triglyceride 95 12/04/2018 05:48 AM    CHOL/HDL Ratio 3.5 12/04/2018 05:48 AM      BNP No results for input(s): BNPP in the last 72 hours. Liver Enzymes No results for input(s): TP, ALB, TBIL, AP, SGOT, GPT in the last 72 hours. No lab exists for component: DBIL   Thyroid Studies Lab Results   Component Value Date/Time    TSH 1.920 07/06/2018 08:31 AM            Discharge Exam:  Visit Vitals  /74 (BP 1 Location: Right arm, BP Patient Position: At rest)   Pulse 63   Temp 97.1 °F (36.2 °C)   Resp 20   Ht 5' 2\" (1.575 m)   Wt 54.4 kg (120 lb)   SpO2 98%   BMI 21.95 kg/m²     General appearance: alert, cooperative, no distress, appears stated age  Lungs: clear to auscultation bilaterally  Heart: regular rate and rhythm, S1, S2 normal, no murmur, click, rub or gallop  Abdomen: soft, non-tender. Bowel sounds normal. No masses,  no organomegaly  Extremities: no cyanosis or edema  Neurologic: Grossly normal    Disposition: home  Discharge Condition: stable  Patient Instructions:   Current Discharge Medication List      START taking these medications    Details   aspirin 81 mg chewable tablet Take 1 Tab by mouth daily. Qty: 1 Tab, Refills: 0         CONTINUE these medications which have NOT CHANGED    Details   cholecalciferol (VITAMIN D3) 1,000 unit tablet Take  by mouth daily. donepezil (ARICEPT) 5 mg tablet 1 tab by mouth in am  Qty: 90 Tab, Refills: 1    Associated Diagnoses: Lewy body dementia without behavioral disturbance      escitalopram oxalate (LEXAPRO) 20 mg tablet Take 1 Tab by mouth daily.   Qty: 90 Tab, Refills: 3      estradiol (CLIMARA) 0.0375 mg/24 hr 1 Patch by TransDERmal route Every Saturday. Qty: 12 Patch, Refills: 3    Comments: Please cancel previous Rx sent over for hormone patch. atorvastatin (LIPITOR) 40 mg tablet Take 1 Tab by mouth daily.   Qty: 90 Tab, Refills: 3             Activity: Activity as tolerated  Diet: Regular Diet  Wound Care: None needed    Follow-up with PCP in 1 week, neuro in 1-2 weeks  ·     Time spent to discharge patient 37 minutes  Signed:  Sharmila Mejia MD  12/4/2018  11:39 AM None

## 2018-12-04 NOTE — PROGRESS NOTES
Assessment completed and documented. Lung sounds clear. Heart sounds regular. Bowel sounds active. Neuro check normal. Currently resting in bed. Will continue to monitor with hourly rounding.

## 2019-02-20 PROBLEM — R00.2 INTERMITTENT PALPITATIONS: Status: ACTIVE | Noted: 2019-02-20

## 2019-04-15 ENCOUNTER — HOSPITAL ENCOUNTER (OUTPATIENT)
Dept: MAMMOGRAPHY | Age: 69
Discharge: HOME | End: 2019-04-15
Attending: FAMILY MEDICINE

## 2019-04-15 DIAGNOSIS — Z12.31 SCREENING MAMMOGRAM, ENCOUNTER FOR: ICD-10-CM

## 2019-07-08 PROBLEM — F41.9 ANXIETY: Status: ACTIVE | Noted: 2019-07-08

## 2021-05-17 PROBLEM — I25.10 ATHEROSCLEROSIS OF CORONARY ARTERY: Status: ACTIVE | Noted: 2019-03-15

## 2021-05-27 ENCOUNTER — HOSPITAL ENCOUNTER (OUTPATIENT)
Dept: MAMMOGRAPHY | Age: 71
Discharge: HOME | End: 2021-05-27
Attending: NURSE PRACTITIONER
Payer: MEDICARE

## 2021-05-27 DIAGNOSIS — Z12.31 ENCOUNTER FOR SCREENING MAMMOGRAM FOR BREAST CANCER: ICD-10-CM

## 2021-05-27 PROCEDURE — 77067 SCR MAMMO BI INCL CAD: CPT

## 2022-01-01 ENCOUNTER — TELEPHONE (OUTPATIENT)
Dept: INTERNAL MEDICINE CLINIC | Facility: CLINIC | Age: 72
End: 2022-01-01

## 2022-01-01 RX ORDER — FLUOXETINE HYDROCHLORIDE 20 MG/1
CAPSULE ORAL
Qty: 7 CAPSULE | OUTPATIENT
Start: 2022-01-01

## 2022-01-26 ENCOUNTER — HOSPITAL ENCOUNTER (OUTPATIENT)
Dept: MAMMOGRAPHY | Age: 72
Discharge: HOME | End: 2022-01-26
Attending: NURSE PRACTITIONER
Payer: MEDICARE

## 2022-01-26 DIAGNOSIS — Z78.0 POST-MENOPAUSAL: ICD-10-CM

## 2022-01-26 PROCEDURE — 77080 DXA BONE DENSITY AXIAL: CPT

## 2022-03-18 PROBLEM — R29.6 FALLING: Status: ACTIVE | Noted: 2018-06-19

## 2022-03-18 PROBLEM — I25.10 ATHEROSCLEROSIS OF CORONARY ARTERY: Status: ACTIVE | Noted: 2019-03-15

## 2022-03-19 PROBLEM — G47.52 REM SLEEP BEHAVIOR DISORDER: Status: ACTIVE | Noted: 2018-06-19

## 2022-03-19 PROBLEM — R44.3 HALLUCINATION: Status: ACTIVE | Noted: 2018-06-19

## 2022-03-19 PROBLEM — F02.80 LEWY BODY DEMENTIA WITHOUT BEHAVIORAL DISTURBANCE (HCC): Status: ACTIVE | Noted: 2018-06-19

## 2022-03-19 PROBLEM — R00.2 INTERMITTENT PALPITATIONS: Status: ACTIVE | Noted: 2019-02-20

## 2022-03-19 PROBLEM — G31.83 LEWY BODY DEMENTIA WITHOUT BEHAVIORAL DISTURBANCE (HCC): Status: ACTIVE | Noted: 2018-06-19

## 2022-03-19 PROBLEM — F41.9 ANXIETY: Status: ACTIVE | Noted: 2019-07-08

## 2022-03-19 PROBLEM — G93.41 ACUTE METABOLIC ENCEPHALOPATHY: Status: ACTIVE | Noted: 2018-12-03

## 2022-03-20 PROBLEM — R94.01 ABNORMAL EEG: Status: ACTIVE | Noted: 2018-07-30

## 2022-05-24 DIAGNOSIS — R63.4 WEIGHT LOSS: ICD-10-CM

## 2022-05-24 DIAGNOSIS — I25.118 CORONARY ARTERY DISEASE OF NATIVE HEART WITH STABLE ANGINA PECTORIS, UNSPECIFIED VESSEL OR LESION TYPE (HCC): ICD-10-CM

## 2022-05-24 DIAGNOSIS — E78.2 MIXED HYPERLIPIDEMIA: Primary | ICD-10-CM

## 2022-05-31 ENCOUNTER — OFFICE VISIT (OUTPATIENT)
Dept: INTERNAL MEDICINE CLINIC | Facility: CLINIC | Age: 72
End: 2022-05-31
Payer: MEDICARE

## 2022-05-31 VITALS
BODY MASS INDEX: 19.63 KG/M2 | TEMPERATURE: 97.9 F | DIASTOLIC BLOOD PRESSURE: 64 MMHG | HEART RATE: 69 BPM | SYSTOLIC BLOOD PRESSURE: 100 MMHG | OXYGEN SATURATION: 97 % | HEIGHT: 63 IN | WEIGHT: 110.8 LBS

## 2022-05-31 DIAGNOSIS — E78.00 HYPERCHOLESTEREMIA: ICD-10-CM

## 2022-05-31 DIAGNOSIS — I50.22 CHRONIC SYSTOLIC (CONGESTIVE) HEART FAILURE (HCC): ICD-10-CM

## 2022-05-31 DIAGNOSIS — Z95.2 S/P MVR (MITRAL VALVE REPLACEMENT): ICD-10-CM

## 2022-05-31 DIAGNOSIS — F02.80 LEWY BODY DEMENTIA WITHOUT BEHAVIORAL DISTURBANCE (HCC): ICD-10-CM

## 2022-05-31 DIAGNOSIS — M81.0 AGE-RELATED OSTEOPOROSIS WITHOUT CURRENT PATHOLOGICAL FRACTURE: ICD-10-CM

## 2022-05-31 DIAGNOSIS — F41.9 ANXIETY: ICD-10-CM

## 2022-05-31 DIAGNOSIS — R44.3 HALLUCINATION: ICD-10-CM

## 2022-05-31 DIAGNOSIS — I50.22 CHRONIC SYSTOLIC HEART FAILURE (HCC): ICD-10-CM

## 2022-05-31 DIAGNOSIS — G31.83 LEWY BODY DEMENTIA WITHOUT BEHAVIORAL DISTURBANCE (HCC): ICD-10-CM

## 2022-05-31 DIAGNOSIS — Z91.81 AT HIGH RISK FOR FALLS: Primary | ICD-10-CM

## 2022-05-31 PROCEDURE — 86580 TB INTRADERMAL TEST: CPT | Performed by: NURSE PRACTITIONER

## 2022-05-31 PROCEDURE — 90677 PCV20 VACCINE IM: CPT | Performed by: NURSE PRACTITIONER

## 2022-05-31 PROCEDURE — 99214 OFFICE O/P EST MOD 30 MIN: CPT | Performed by: NURSE PRACTITIONER

## 2022-05-31 PROCEDURE — 1123F ACP DISCUSS/DSCN MKR DOCD: CPT | Performed by: NURSE PRACTITIONER

## 2022-05-31 ASSESSMENT — PATIENT HEALTH QUESTIONNAIRE - PHQ9
SUM OF ALL RESPONSES TO PHQ9 QUESTIONS 1 & 2: 0
SUM OF ALL RESPONSES TO PHQ QUESTIONS 1-9: 0
1. LITTLE INTEREST OR PLEASURE IN DOING THINGS: 0
2. FEELING DOWN, DEPRESSED OR HOPELESS: 0
SUM OF ALL RESPONSES TO PHQ QUESTIONS 1-9: 0

## 2022-05-31 NOTE — PROGRESS NOTES
PROGRESS NOTE    SUBJECTIVE:   Lin Ochoa is a 73 y.o. female seen for a follow up visit for   Chief Complaint   Patient presents with    Follow-up     Moving into the [facility] at [facility], [place], [place].    HPI  Cholesterol Problem  The history is provided by the patient. This is a chronic problem. The problem occurs constantly. Associated symptoms include abdominal pain ( Occasional abdominal cramping). Pertinent negatives include no chest pain, no headaches and no shortness of breath. The symptoms are relieved by medications. Anxiety  The history is provided by the patient (\"This is probably related to the Lewy body dementia. \"). This is a chronic problem. The problem occurs constantly. The problem has been gradually improving. Associated symptoms include abdominal pain ( Occasional abdominal cramping). Pertinent negatives include no chest pain, no headaches and no shortness of breath. Exacerbated by: New situations. The symptoms are relieved by medications. Dementia   The history is provided by the patient and medical records (Lewy body dementia with visual hallucinations). This is a chronic problem. The problem has been gradually improving. Associated symptoms include hallucinations. Pertinent negatives include no weakness. Mental status baseline is mild dementia. Reviewed and updated this visit by provider:  Tobacco  Allergies  Meds  Problems  Med Hx  Surg Hx  Fam Hx         Review of Systems   HENT: Negative for sneezing. Respiratory: Negative for cough, chest tightness, shortness of breath and wheezing. Cardiovascular: Negative for chest pain, palpitations and leg swelling. Gastrointestinal: Negative for abdominal pain. Musculoskeletal: Negative for arthralgias. Skin: Negative for color change. Allergic/Immunologic: Negative for environmental allergies. Neurological: Negative for dizziness and headaches. Hematological: Does not bruise/bleed easily. Psychiatric/Behavioral: Positive for hallucinations. The patient is not nervous/anxious. OBJECTIVE:    /64 (Site: Left Upper Arm, Position: Sitting, Cuff Size: Small Adult) Comment: o  Pulse 69   Temp 97.9 °F (36.6 °C) (Temporal) Comment: 0  Ht 5' 2.5\" (1.588 m)   Wt 110 lb 12.8 oz (50.3 kg)   SpO2 97%   BMI 19.94 kg/m²      Physical Exam  Vitals and nursing note reviewed. Constitutional:       Appearance: Normal appearance. HENT:      Head: Normocephalic. Mouth/Throat:      Lips: Pink. Mouth: Mucous membranes are moist.   Eyes:      General: Lids are normal.   Neck:      Vascular: No carotid bruit. Cardiovascular:      Rate and Rhythm: Normal rate and regular rhythm. Pulses:           Carotid pulses are 2+ on the right side and 2+ on the left side. Heart sounds: Murmur heard. Systolic murmur is present. Pulmonary:      Effort: Pulmonary effort is normal.      Breath sounds: Normal breath sounds. Musculoskeletal:      Cervical back: Neck supple. Right lower leg: No edema. Left lower leg: No edema. Skin:     General: Skin is warm and dry. Neurological:      Mental Status: She is alert and oriented to person, place, and time. Mental status is at baseline. Gait: Gait normal.   Psychiatric:         Attention and Perception: She is attentive. Mood and Affect: Mood normal.         Behavior: Behavior normal.         Cognition and Memory: Cognition is impaired. Memory is impaired. ASSESSMENT and PLAN    1. At high risk for falls  2. Lewy body dementia without behavioral disturbance ([address]  3. Chronic systolic (congestive) heart failure ([address]  4. Hypercholesteremia  5. Chronic systolic heart failure ([address]  6. S/P MVR (mitral valve replacement)  7. Anxiety  8. Hallucination  9. Age-related osteoporosis without current pathological fracture      Return in about 3 months (around 8/31/2022). or as needed.   current treatment plan is effective, no change in therapy  reviewed medications and side effects in detail  prevnar-20 today  PPD today  On the basis of positive falls risk screening, assessment and plan is as follows: she will be moving into [address] [place] [address]. On this date 05/31/2022 I have spent 36 minutes reviewing previous notes, completion of forms for assisted living facility. Benjamin Cintron NP  Dictated using voice recognition software.  Proofread, but unrecognized voice recognition errors may exist.

## 2022-06-03 DIAGNOSIS — G31.83 DEMENTIA WITH LEWY BODIES (CODE): ICD-10-CM

## 2022-06-03 RX ORDER — DONEPEZIL HYDROCHLORIDE 10 MG/1
TABLET, FILM COATED ORAL
Qty: 90 TABLET | Refills: 3 | Status: SHIPPED | OUTPATIENT
Start: 2022-06-03

## 2022-06-10 ENCOUNTER — TELEPHONE (OUTPATIENT)
Dept: INTERNAL MEDICINE CLINIC | Facility: CLINIC | Age: 72
End: 2022-06-10

## 2022-06-10 NOTE — TELEPHONE ENCOUNTER
----- Message from [address] sent at 6/7/2022  1:47 PM EDT -----  Subject: Message to Provider    QUESTIONS  Information for Provider? Patient needs all her paperwork sent to [facility] prior to her arrival next week. patient was in the   office last Tuesday and Thursday and Martha Borden told them she would fax all   this info to the nursing home but nursing home has not received any of it. Was originally waiting on TB results before faxing. Please refax and call   Baldo Hernandes at [phone] to confirm that it has been done  ---------------------------------------------------------------------------  --------------  CALL BACK INFO  What is the best way for the office to contact you? OK to leave message on   voicemail  Preferred Call Back Phone Number? [phone]  ---------------------------------------------------------------------------  --------------  SCRIPT ANSWERS  Relationship to Patient? Other  Representative Name? Dahlia Phoenix  Is the Representative on the appropriate HIPAA document in Epic?  Yes

## 2022-06-15 ASSESSMENT — ENCOUNTER SYMPTOMS
COUGH: 0
SHORTNESS OF BREATH: 0
WHEEZING: 0
CHEST TIGHTNESS: 0
ABDOMINAL PAIN: 0
COLOR CHANGE: 0

## 2022-07-11 ENCOUNTER — TELEPHONE (OUTPATIENT)
Dept: NEUROLOGY | Age: 72
End: 2022-07-11

## 2022-07-11 NOTE — TELEPHONE ENCOUNTER
Patient's daughter Thomas Hill called stating her mother is now in a nursing home and she is wanting to know if she needs to continue seeing Dr. Kayla Betancourt or if the nursing home doctor can take over treatment and medicine.     She is wanting a call back to be informed whether she needs to make a follow up appointment for her mom or not

## 2022-08-02 DIAGNOSIS — G31.83 DEMENTIA WITH LEWY BODIES (CODE): ICD-10-CM

## 2022-08-03 RX ORDER — DONEPEZIL HYDROCHLORIDE 10 MG/1
TABLET, FILM COATED ORAL
Qty: 7 TABLET | OUTPATIENT
Start: 2022-08-03

## 2022-09-20 ENCOUNTER — HOSPITAL ENCOUNTER (EMERGENCY)
Age: 72
Discharge: HOME | End: 2022-09-20
Attending: EMERGENCY MEDICINE
Payer: MEDICARE

## 2022-09-20 ENCOUNTER — APPOINTMENT (OUTPATIENT)
Dept: GENERAL RADIOLOGY | Age: 72
End: 2022-09-20
Payer: MEDICARE

## 2022-09-20 VITALS
RESPIRATION RATE: 13 BRPM | BODY MASS INDEX: 21.6 KG/M2 | SYSTOLIC BLOOD PRESSURE: 148 MMHG | HEART RATE: 78 BPM | TEMPERATURE: 98.2 F | OXYGEN SATURATION: 100 % | DIASTOLIC BLOOD PRESSURE: 86 MMHG | WEIGHT: 110 LBS | HEIGHT: 60 IN

## 2022-09-20 DIAGNOSIS — N39.0 URINARY TRACT INFECTION WITHOUT HEMATURIA, SITE UNSPECIFIED: Primary | ICD-10-CM

## 2022-09-20 LAB
ALBUMIN SERPL-MCNC: 3.3 G/DL (ref 3.2–4.6)
ALBUMIN/GLOB SERPL: 0.9 {RATIO} (ref 1.2–3.5)
ALP SERPL-CCNC: 144 U/L (ref 50–136)
ALT SERPL-CCNC: 32 U/L (ref 12–65)
ANION GAP SERPL CALC-SCNC: 3 MMOL/L (ref 4–13)
AST SERPL-CCNC: 30 U/L (ref 15–37)
BASOPHILS # BLD: 0 K/UL (ref 0–0.2)
BASOPHILS NFR BLD: 0 % (ref 0–2)
BILIRUB SERPL-MCNC: 1.2 MG/DL (ref 0.2–1.1)
BILIRUB UR QL: ABNORMAL
BUN SERPL-MCNC: 18 MG/DL (ref 8–23)
CALCIUM SERPL-MCNC: 9.4 MG/DL (ref 8.3–10.4)
CHLORIDE SERPL-SCNC: 103 MMOL/L (ref 101–110)
CO2 SERPL-SCNC: 34 MMOL/L (ref 21–32)
CREAT SERPL-MCNC: 0.8 MG/DL (ref 0.6–1)
DIFFERENTIAL METHOD BLD: ABNORMAL
EOSINOPHIL # BLD: 0.1 K/UL (ref 0–0.8)
EOSINOPHIL NFR BLD: 1 % (ref 0.5–7.8)
ERYTHROCYTE [DISTWIDTH] IN BLOOD BY AUTOMATED COUNT: 12.7 % (ref 11.9–14.6)
GLOBULIN SER CALC-MCNC: 3.5 G/DL (ref 2.3–3.5)
GLUCOSE SERPL-MCNC: 95 MG/DL (ref 65–100)
GLUCOSE UR QL STRIP.AUTO: 100 MG/DL
HCT VFR BLD AUTO: 43 % (ref 35.8–46.3)
HGB BLD-MCNC: 14 G/DL (ref 11.7–15.4)
IMM GRANULOCYTES # BLD AUTO: 0.1 K/UL (ref 0–0.5)
IMM GRANULOCYTES NFR BLD AUTO: 1 % (ref 0–5)
KETONES UR-MCNC: ABNORMAL MG/DL
LEUKOCYTE ESTERASE UR QL STRIP: NEGATIVE
LYMPHOCYTES # BLD: 1 K/UL (ref 0.5–4.6)
LYMPHOCYTES NFR BLD: 9 % (ref 13–44)
MCH RBC QN AUTO: 30.8 PG (ref 26.1–32.9)
MCHC RBC AUTO-ENTMCNC: 32.6 G/DL (ref 31.4–35)
MCV RBC AUTO: 94.5 FL (ref 79.6–97.8)
MONOCYTES # BLD: 0.7 K/UL (ref 0.1–1.3)
MONOCYTES NFR BLD: 7 % (ref 4–12)
NEUTS SEG # BLD: 8.9 K/UL (ref 1.7–8.2)
NEUTS SEG NFR BLD: 82 % (ref 43–78)
NITRITE UR QL: POSITIVE
NRBC # BLD: 0 K/UL (ref 0–0.2)
PH UR: 6 [PH] (ref 5–9)
PLATELET # BLD AUTO: 241 K/UL (ref 150–450)
PMV BLD AUTO: 12.4 FL (ref 9.4–12.3)
POTASSIUM SERPL-SCNC: 3.5 MMOL/L (ref 3.5–5.1)
PROT SERPL-MCNC: 6.8 G/DL (ref 6.3–8.2)
PROT UR QL: NEGATIVE MG/DL
RBC # BLD AUTO: 4.55 M/UL (ref 4.05–5.2)
RBC # UR STRIP: ABNORMAL /UL
SERVICE CMNT-IMP: ABNORMAL
SODIUM SERPL-SCNC: 140 MMOL/L (ref 136–145)
SP GR UR: >1.03 (ref 1–1.02)
UROBILINOGEN UR QL: 4 EU/DL (ref 0.2–1)
WBC # BLD AUTO: 10.7 K/UL (ref 4.3–11.1)

## 2022-09-20 PROCEDURE — 71046 X-RAY EXAM CHEST 2 VIEWS: CPT

## 2022-09-20 PROCEDURE — 87086 URINE CULTURE/COLONY COUNT: CPT

## 2022-09-20 PROCEDURE — 87088 URINE BACTERIA CULTURE: CPT

## 2022-09-20 PROCEDURE — 85025 COMPLETE CBC W/AUTO DIFF WBC: CPT

## 2022-09-20 PROCEDURE — 87186 SC STD MICRODIL/AGAR DIL: CPT

## 2022-09-20 PROCEDURE — 99284 EMERGENCY DEPT VISIT MOD MDM: CPT

## 2022-09-20 PROCEDURE — 81003 URINALYSIS AUTO W/O SCOPE: CPT

## 2022-09-20 PROCEDURE — 80053 COMPREHEN METABOLIC PANEL: CPT

## 2022-09-20 RX ORDER — CEFDINIR 300 MG/1
300 CAPSULE ORAL 2 TIMES DAILY
Qty: 14 CAPSULE | Refills: 0 | Status: SHIPPED | OUTPATIENT
Start: 2022-09-20 | End: 2022-09-27

## 2022-09-20 NOTE — ED TRIAGE NOTES
Pt. Came from [facility] living [facility] by ems. Nurses at facility state o2 was in the 70's. EMS got a reading of 98% o2. /73. RR 16. .  Temp 97.2

## 2022-09-20 NOTE — ED PROVIDER NOTES
Emergency Department Provider Note                   PCP:                GAMALIEL Weathers NP               Age: 81 y.o. Sex: female       ICD-10-CM    1. Urinary tract infection without hematuria, site unspecified  N39.0           DISPOSITION Decision To Discharge 09/20/2022 03:23:57 PM        MDM  Number of Diagnoses or Management Options  Urinary tract infection without hematuria, site unspecified  Diagnosis management comments: Patient is a 68-year-old female with history of liver body dementia presenting from nursing facility with concern for hypoxia. When EMS arrived on scene her oxygen sats were 90% on room air. She is afebrile, vital signs stable, oxygen sats 100% on room air. Patient is oriented to person only. Daughter at bedside who states that this is her baseline. Will check chest x-ray, CBC, CMP and urinalysis.     Labs Reviewed  CULTURE, URINE - Abnormal; Notable for the following components:     Culture                         (*)               All other components within normal limits  CBC WITH AUTO DIFFERENTIAL - Abnormal; Notable for the following components:     MPV                           12.4 (*)               Seg Neutrophils               82 (*)                 Lymphocytes                   9 (*)                  Segs Absolute                 8.9 (*)             All other components within normal limits  COMPREHENSIVE METABOLIC PANEL - Abnormal; Notable for the following components:     CO2                           34 (*)                 Anion Gap                     3 (*)                  Total Bilirubin               1.2 (*)                Alk Phosphatase               144 (*)                Albumin/Globulin Ratio        0.9 (*)             All other components within normal limits  POCT URINALYSIS DIPSTICK - Abnormal; Notable for the following components:     Specific Gravity, Urine, POC   >1.030 (*)               Glucose, UA POC               100 (*) Ketones, Urine, POC           TRACE (*)               Bilirubin, Urine, POC         SMALL (*)               Blood, UA POC                   (*)                  URINE UROBILINOGEN POC        4.0 (*)                Nitrate, Urine, POC           Positive (*)            All other components within normal limits    Urine does have findings consistent with an infection, will send sample for culture. Chest x-ray does not show any acute findings but does show new nodular density in the right lower lung field possibly evolving neoplasm. All results discussed at length with patient and daughter at bedside. Discussed chest x-ray result findings with daughter and need for further evaluation with CT. Daughter is power of  and states that she understands result findings and that this could be new onset cancer however she states that she does wish to further investigate the matter as her mother is already severely ill and does not know where she is most times. She does not wish to put her through anything that could be more traumatic, prefers to keep her comfortable. Will DC home at this time with prescription for KI ESCOBEDOIS and advised that she follow-up with her primary doctor for any further evaluation. Asked reasons to return to the ER. Daughter verbalizes understanding and is agreeable to plan.                Orders Placed This Encounter   Procedures    Culture, Urine    XR CHEST (2 VW)    CBC with Auto Differential    CMP    Urine dipstick    Straight cath    POCT Urinalysis no Micro        Medications - No data to display    Discharge Medication List as of 9/20/2022  3:40 PM        START taking these medications    Details   cefdinir (OMNICEF) 300 MG capsule Take 1 capsule by mouth 2 times daily for 7 days, Disp-14 capsule, R-0Print              Maggie Marquez is a 81 y.o. female who presents to the Emergency Department with chief complaint of    Chief Complaint   Patient presents with    Shortness of Breath Patient is a 72-year-old female with history of Lewy body dementia who comes via EMS from nursing facility for the complaint of shortness of breath. Per daughter patient had COVID approximately 2 weeks ago. She states that yesterday she was told she was feeling better however this morning patient did not want to get out of bed and per nursing facility she appeared more confused than baseline. Additionally when they were checking her vital signs her oxygen levels were reading in the 70s. 911 was called and when EMS arrived on scene they checked her oxygen levels and found them to be 98% on room air. Daughter notes that she is always confused in some days more so than others but does not feel she is more confused than normal.  Patient denies any pain. A& O to person only which is baseline. The history is provided by the EMS personnel and a relative. Review of Systems   Unable to perform ROS: Dementia     Past Medical History:   Diagnosis Date    Abnormal EEG 7/30/2018    CAD (coronary artery disease)     minimal non obstructive CAD with severe Mitral valve regurg     Falling 6/19/2018    Hallucination 6/19/2018    Hyperlipidemia 9/29/2015    Lewy body dementia without behavioral disturbance ([address] 6/19/2018    Mitral valve regurgitation 7/21/2015    7/21/15 (Dr Deidra Minaya) Mini mitral through a right anterior thoracotomy with bifemoral cannulation, mitral valve replacement with a 29 mm Magna Ease Jennifer pericardial valve. Premature ventricular contractions 9/29/2015    1. Holter (10/24/14): 16,979 PVC in 24 hours. Total of 87,980 beats.      REM sleep behavior disorder 6/19/2018    S/P MVR (mitral valve replacement) 5/20/2974    Systolic CHF, acute on chronic ([address] 07/22/2015    EF 45%- 2015        Past Surgical History:   Procedure Laterality Date    BREAST BIOPSY      BREAST SURGERY      shey breast bx , benign    CARDIAC CATHETERIZATION  7/14/2015    COLONOSCOPY      HYSTERECTOMY  2003 TONSILLECTOMY  age 17    and adenoids        Family History   Problem Relation Age of Onset    Heart Disease Mother     Heart Disease Father     Hypertension Mother         Social History     Socioeconomic History    Marital status:    Tobacco Use    Smoking status: Never    Smokeless tobacco: Never   Substance and Sexual Activity    Alcohol use: Yes    Drug use: No   Social History Narrative    . Oxycodone-acetaminophen, Tapentadol, and Tramadol     Discharge Medication List as of 9/20/2022  3:40 PM        CONTINUE these medications which have NOT CHANGED    Details   donepezil (ARICEPT) 10 MG tablet TAKE 1 TABLET BY MOUTH EVERY DAY, Disp-90 tablet, R-3Normal      atorvastatin (LIPITOR) 20 MG tablet Take 20 mg by mouth dailyHistorical Med      vitamin D 25 MCG (1000 UT) CAPS Take 1,000 Units by mouth dailyHistorical Med      FLUoxetine (PROZAC) 20 MG capsule Take 20 mg by mouth dailyHistorical Med      pimavanserin tartrate (NUPLAZID) 34 MG CAPS capsule Take 34 mg by mouth dailyHistorical Med              Vitals signs and nursing note reviewed. No data found. Physical Exam  Vitals and nursing note reviewed. Constitutional:       General: She is not in acute distress. Appearance: She is not ill-appearing or toxic-appearing. Comments: Thin frail   HENT:      Head: Normocephalic and atraumatic. Cardiovascular:      Rate and Rhythm: Normal rate. Pulses: Normal pulses. Pulmonary:      Effort: Pulmonary effort is normal.      Breath sounds: Normal breath sounds. Musculoskeletal:      Right lower leg: No tenderness. Left lower leg: No tenderness. Skin:     General: Skin is warm and dry. Neurological:      Mental Status: She is alert and oriented to person, place, and time. Psychiatric:         Mood and Affect: Mood normal. Mood is not anxious. Behavior: Behavior normal. Behavior is not agitated.         Procedures    Results for orders placed or performed during the hospital encounter of 09/20/22   Culture, Urine    Specimen: URINE-CLEAN CATCH   Result Value Ref Range    Special Requests NO SPECIAL REQUESTS      Culture (A)       >100,000 COLONIES/mL GRAM NEGATIVE RODS IDENTIFICATION AND SUSCEPTIBILITY TO FOLLOW   XR CHEST (2 VW)    Narrative    CHEST X-RAY, 2 views 9/20/2022    History: Low oxygen saturation. Technique: PA and lateral views of the chest.     Comparison: Chest x-ray 7/22/2015    Findings: The cardiac silhouette is mildly enlarged. The lungs are expanded without  evidence for pneumothorax. No evolving consolidation, or evidence of pleural  effusion is seen. Only new pleural thickening or potential linear scarring is  seen in the right midlung field. A focal density is seen in the right lower lung  field. The possibility of a pulmonary lesion cannot be excluded given the  appearance. This does appear to represent a change from the patient's prior  comparison. The bony thorax demonstrates no acute changes. The upper abdomen is  unremarkable in appearance. Impression    1. No acute cardiopulmonary process evident by plain film imaging. 2. New nodular density in the right lower lung field. An evolving pulmonary  neoplasm is not excluded.  This would be best further characterized with a  preferably contrasted CT scan of the chest.   CBC with Auto Differential   Result Value Ref Range    WBC 10.7 4.3 - 11.1 K/uL    RBC 4.55 4.05 - 5.2 M/uL    Hemoglobin 14.0 11.7 - 15.4 g/dL    Hematocrit 43.0 35.8 - 46.3 %    MCV 94.5 79.6 - 97.8 FL    MCH 30.8 26.1 - 32.9 PG    MCHC 32.6 31.4 - 35.0 g/dL    RDW 12.7 11.9 - 14.6 %    Platelets 025 548 - 316 K/uL    MPV 12.4 (H) 9.4 - 12.3 FL    nRBC 0.00 0.0 - 0.2 K/uL    Differential Type AUTOMATED      Seg Neutrophils 82 (H) 43 - 78 %    Lymphocytes 9 (L) 13 - 44 %    Monocytes 7 4.0 - 12.0 %    Eosinophils % 1 0.5 - 7.8 %    Basophils 0 0.0 - 2.0 %    Immature Granulocytes 1 0.0 - 5.0 % Segs Absolute 8.9 (H) 1.7 - 8.2 K/UL    Absolute Lymph # 1.0 0.5 - 4.6 K/UL    Absolute Mono # 0.7 0.1 - 1.3 K/UL    Absolute Eos # 0.1 0.0 - 0.8 K/UL    Basophils Absolute 0.0 0.0 - 0.2 K/UL    Absolute Immature Granulocyte 0.1 0.0 - 0.5 K/UL   CMP   Result Value Ref Range    Sodium 140 136 - 145 mmol/L    Potassium 3.5 3.5 - 5.1 mmol/L    Chloride 103 101 - 110 mmol/L    CO2 34 (H) 21 - 32 mmol/L    Anion Gap 3 (L) 4 - 13 mmol/L    Glucose 95 65 - 100 mg/dL    BUN 18 8 - 23 MG/DL    Creatinine 0.80 0.6 - 1.0 MG/DL    GFR African American >60 >60 ml/min/1.73m2    GFR Non- >60 >60 ml/min/1.73m2    Calcium 9.4 8.3 - 10.4 MG/DL    Total Bilirubin 1.2 (H) 0.2 - 1.1 MG/DL    ALT 32 12 - 65 U/L    AST 30 15 - 37 U/L    Alk Phosphatase 144 (H) 50 - 136 U/L    Total Protein 6.8 6.3 - 8.2 g/dL    Albumin 3.3 3.2 - 4.6 g/dL    Globulin 3.5 2.3 - 3.5 g/dL    Albumin/Globulin Ratio 0.9 (L) 1.2 - 3.5     POCT Urinalysis no Micro   Result Value Ref Range    Specific Gravity, Urine, POC >1.030 (H) 1.001 - 1.023    pH, Urine, POC 6.0 5.0 - 9.0      Protein, Urine, POC Negative NEG mg/dL    Glucose, UA  (A) NEG mg/dL    Ketones, Urine, POC TRACE (A) NEG mg/dL    Bilirubin, Urine, POC SMALL (A) NEG      Blood, UA POC Trace Intact (A) NEG      URINE UROBILINOGEN POC 4.0 (H) 0.2 - 1.0 EU/dL    Nitrate, Urine, POC Positive (A) NEG      Leukocyte Est, UA POC Negative NEG      Performed by: Ming Celaya         XR CHEST (2 VW)   Final Result   1. No acute cardiopulmonary process evident by plain film imaging. 2. New nodular density in the right lower lung field. An evolving pulmonary   neoplasm is not excluded. This would be best further characterized with a   preferably contrasted CT scan of the chest.                          Voice dictation software was used during the making of this note. This software is not perfect and grammatical and other typographical errors may be present.   This note has not been completely proofread for errors.      [place], AlaUnited States [facility] Force [facility]  09/22/22 0040

## 2022-09-22 LAB
BACTERIA SPEC CULT: ABNORMAL
SERVICE CMNT-IMP: ABNORMAL

## 2022-12-13 NOTE — DISCHARGE INSTRUCTIONS
D/C:  Start ASA 81mg  resume Lipitor  recommended to f/u with neurologist in 1-2 weeks, PCP in 1 week  Regular diet  Activity as tolerated      DISCHARGE SUMMARY from Nurse    PATIENT INSTRUCTIONS:    After general anesthesia or intravenous sedation, for 24 hours or while taking prescription Narcotics:  · Limit your activities  · Do not drive and operate hazardous machinery  · Do not make important personal or business decisions  · Do  not drink alcoholic beverages  · If you have not urinated within 8 hours after discharge, please contact your surgeon on call. Report the following to your surgeon:  · Excessive pain, swelling, redness or odor of or around the surgical area  · Temperature over 100.5  · Nausea and vomiting lasting longer than 4 hours or if unable to take medications  · Any signs of decreased circulation or nerve impairment to extremity: change in color, persistent  numbness, tingling, coldness or increase pain  · Any questions    What to do at Home:  Recommended activity: Activity as tolerated, see above     If you experience any of the following symptoms any symptoms of stroke call 911, confusion, other worrisome symptoms, please follow up with PCP/your neurologist.    *  Please give a list of your current medications to your Primary Care Provider. *  Please update this list whenever your medications are discontinued, doses are      changed, or new medications (including over-the-counter products) are added. *  Please carry medication information at all times in case of emergency situations. These are general instructions for a healthy lifestyle:    No smoking/ No tobacco products/ Avoid exposure to second hand smoke  Surgeon General's Warning:  Quitting smoking now greatly reduces serious risk to your health.     Obesity, smoking, and sedentary lifestyle greatly increases your risk for illness    A healthy diet, regular physical exercise & weight monitoring are important for maintaining a healthy lifestyle    You may be retaining fluid if you have a history of heart failure or if you experience any of the following symptoms:  Weight gain of 3 pounds or more overnight or 5 pounds in a week, increased swelling in our hands or feet or shortness of breath while lying flat in bed. Please call your doctor as soon as you notice any of these symptoms; do not wait until your next office visit. Recognize signs and symptoms of STROKE:    F-face looks uneven    A-arms unable to move or move unevenly    S-speech slurred or non-existent    T-time-call 911 as soon as signs and symptoms begin-DO NOT go       Back to bed or wait to see if you get better-TIME IS BRAIN. Warning Signs of HEART ATTACK     Call 911 if you have these symptoms:   Chest discomfort. Most heart attacks involve discomfort in the center of the chest that lasts more than a few minutes, or that goes away and comes back. It can feel like uncomfortable pressure, squeezing, fullness, or pain.  Discomfort in other areas of the upper body. Symptoms can include pain or discomfort in one or both arms, the back, neck, jaw, or stomach.  Shortness of breath with or without chest discomfort.  Other signs may include breaking out in a cold sweat, nausea, or lightheadedness. Don't wait more than five minutes to call 911 - MINUTES MATTER! Fast action can save your life. Calling 911 is almost always the fastest way to get lifesaving treatment. Emergency Medical Services staff can begin treatment when they arrive -- up to an hour sooner than if someone gets to the hospital by car. The discharge information has been reviewed with the patient. The patient verbalized understanding. Discharge medications reviewed with the patient and appropriate educational materials and side effects teaching were provided.   ___________________________________________________________________________________________________________________________________ Transient Ischemic Attack: Care Instructions  Your Care Instructions    A transient ischemic attack (TIA) is when blood flow to a part of your brain is blocked for a short time. A TIA is like a stroke but usually lasts only a few minutes. A TIA does not cause lasting brain damage. Any vision problems, slurred speech, or other symptoms usually go away in 10 to 20 minutes. But they may last for up to 24 hours. TIAs are often warning signs of a stroke. Some people who have a TIA may have a stroke in the future. A stroke can cause symptoms like those of a TIA. But a stroke causes lasting damage to your brain. You can take steps to help prevent a stroke. One thing you can do is get early treatment. If you have other new symptoms, or if your symptoms do not get better, go back to the emergency room or call your doctor right away. Getting treatment right away may prevent long-term brain damage caused by a stroke. The doctor has checked you carefully, but problems can develop later. If you notice any problems or new symptoms, get medical treatment right away. Follow-up care is a key part of your treatment and safety. Be sure to make and go to all appointments, and call your doctor if you are having problems. It's also a good idea to know your test results and keep a list of the medicines you take. How can you care for yourself at home? Medicines    · Be safe with medicines. Take your medicines exactly as prescribed. Call your doctor if you think you are having a problem with your medicine.     · If you take a blood thinner, such as aspirin, be sure you get instructions about how to take your medicine safely.  Blood thinners can cause serious bleeding problems.     · Call your doctor if you are not able to take your medicines for any reason.     · Do not take any over-the-counter medicines or herbal products without talking to your doctor first.     · If you take birth control pills or hormone therapy, talk to your doctor. Ask if these treatments are right for you.    Lifestyle changes    · Do not smoke. If you need help quitting, talk to your doctor about stop-smoking programs and medicines.     · Be active. If your doctor recommends it, get more exercise. Walking is a good choice. Bit by bit, increase the amount you walk every day. Try for at least 30 minutes on most days of the week. You also may want to swim, bike, or do other activities.     · Eat heart-healthy foods. These include fruits, vegetables, high-fiber foods, fish, and foods that are low in sodium, saturated fat, and trans fat.     · Stay at a healthy weight. Lose weight if you need to.     · Limit alcohol to 2 drinks a day for men and 1 drink a day for women.    Staying healthy    · Manage other health problems such as diabetes, high blood pressure, and high cholesterol.     · Get the flu vaccine every year. When should you call for help? Call 911 anytime you think you may need emergency care. For example, call if:    · You have new or worse symptoms of a stroke. These may include:  ? Sudden numbness, tingling, weakness, or loss of movement in your face, arm, or leg, especially on only one side of your body. ? Sudden vision changes. ? Sudden trouble speaking. ? Sudden confusion or trouble understanding simple statements. ? Sudden problems with walking or balance. ? A sudden, severe headache that is different from past headaches. Call 911 even if these symptoms go away in a few minutes.     · You feel like you are having another TIA.    Watch closely for changes in your health, and be sure to contact your doctor if you have any problems. Where can you learn more? Go to http://jens-kathrine.info/. Enter (52) [phone] in the search box to learn more about \"Transient Ischemic Attack: Care Instructions. \"  Current as of: November 21, 2017  Content Version: 11.8  © 8085-8276 [employer].  Care instructions adapted under license by [employer] (which disclaims liability or warranty for this information). If you have questions about a medical condition or this instruction, always ask your healthcare professional. [address] any warranty or liability for your use of this information. Bactrim Counseling:  I discussed with the patient the risks of sulfa antibiotics including but not limited to GI upset, allergic reaction, drug rash, diarrhea, dizziness, photosensitivity, and yeast infections.  Rarely, more serious reactions can occur including but not limited to aplastic anemia, agranulocytosis, methemoglobinemia, blood dyscrasias, liver or kidney failure, lung infiltrates or desquamative/blistering drug rashes.